# Patient Record
Sex: FEMALE | Race: WHITE | Employment: OTHER | URBAN - METROPOLITAN AREA
[De-identification: names, ages, dates, MRNs, and addresses within clinical notes are randomized per-mention and may not be internally consistent; named-entity substitution may affect disease eponyms.]

---

## 2017-04-21 ENCOUNTER — HOSPITAL ENCOUNTER (OUTPATIENT)
Dept: LAB | Age: 67
Discharge: HOME | End: 2017-04-21
Payer: MEDICARE

## 2017-04-21 PROCEDURE — 88305 TISSUE EXAM BY PATHOLOGIST: CPT | Performed by: PLASTIC SURGERY

## 2017-06-27 ENCOUNTER — HOSPITAL ENCOUNTER (OUTPATIENT)
Dept: LAB | Age: 67
Discharge: HOME | End: 2017-06-27
Payer: MEDICARE

## 2017-06-27 PROCEDURE — 88305 TISSUE EXAM BY PATHOLOGIST: CPT | Performed by: PLASTIC SURGERY

## 2017-06-27 PROCEDURE — 88331 PATH CONSLTJ SURG 1 BLK 1SPC: CPT | Performed by: PLASTIC SURGERY

## 2017-06-27 PROCEDURE — 88332 PATH CONSLTJ SURG EA ADD BLK: CPT | Performed by: PLASTIC SURGERY

## 2017-11-14 ENCOUNTER — HOSPITAL ENCOUNTER (OUTPATIENT)
Dept: LAB | Age: 67
Discharge: HOME | End: 2017-11-14
Payer: MEDICARE

## 2017-11-14 PROCEDURE — 88305 TISSUE EXAM BY PATHOLOGIST: CPT | Performed by: PLASTIC SURGERY

## 2017-12-08 ENCOUNTER — HOSPITAL ENCOUNTER (OUTPATIENT)
Dept: LAB | Age: 67
Discharge: HOME | End: 2017-12-08
Payer: MEDICARE

## 2017-12-08 PROCEDURE — 88305 TISSUE EXAM BY PATHOLOGIST: CPT | Performed by: PLASTIC SURGERY

## 2017-12-08 PROCEDURE — 88331 PATH CONSLTJ SURG 1 BLK 1SPC: CPT | Performed by: PLASTIC SURGERY

## 2017-12-08 PROCEDURE — 88332 PATH CONSLTJ SURG EA ADD BLK: CPT | Performed by: PLASTIC SURGERY

## 2018-06-21 ENCOUNTER — OFFICE VISIT (OUTPATIENT)
Dept: UROLOGY | Age: 68
End: 2018-06-21

## 2018-06-21 VITALS
HEIGHT: 61 IN | DIASTOLIC BLOOD PRESSURE: 88 MMHG | HEART RATE: 78 BPM | OXYGEN SATURATION: 95 % | WEIGHT: 162 LBS | SYSTOLIC BLOOD PRESSURE: 144 MMHG | BODY MASS INDEX: 30.58 KG/M2

## 2018-06-21 DIAGNOSIS — R39.15 URINARY URGENCY: ICD-10-CM

## 2018-06-21 DIAGNOSIS — N39.3 STRESS INCONTINENCE OF URINE: Primary | ICD-10-CM

## 2018-06-21 LAB
BILIRUB UR QL STRIP: NEGATIVE
GLUCOSE UR-MCNC: NEGATIVE MG/DL
KETONES P FAST UR STRIP-MCNC: NEGATIVE MG/DL
PH UR STRIP: 5.5 [PH] (ref 4.6–8)
PROT UR QL STRIP: NEGATIVE
SP GR UR STRIP: 1.01 (ref 1–1.03)
UA UROBILINOGEN AMB POC: NORMAL (ref 0.2–1)
URINALYSIS CLARITY POC: CLEAR
URINALYSIS COLOR POC: YELLOW
URINE BLOOD POC: NEGATIVE
URINE LEUKOCYTES POC: NORMAL
URINE NITRITES POC: NEGATIVE

## 2018-06-21 RX ORDER — HYDROCORTISONE ACETATE 1 %
200 CREAM (GRAM) TOPICAL
COMMUNITY

## 2018-06-21 RX ORDER — ASPIRIN 325 MG
300 TABLET, DELAYED RELEASE (ENTERIC COATED) ORAL
COMMUNITY

## 2018-06-21 RX ORDER — ASCORBIC ACID 250 MG
500 TABLET ORAL
COMMUNITY

## 2018-06-21 RX ORDER — GUAIFENESIN 100 MG/5ML
LIQUID (ML) ORAL
Status: ON HOLD | COMMUNITY
Start: 2014-04-23 | End: 2018-09-07

## 2018-06-21 RX ORDER — LOSARTAN POTASSIUM 100 MG/1
TABLET ORAL DAILY
COMMUNITY
Start: 2018-05-14

## 2018-06-21 RX ORDER — THERA TABS 400 MCG
TAB ORAL
COMMUNITY

## 2018-06-21 RX ORDER — SUCRALFATE 1 G/1
TABLET ORAL
Status: ON HOLD | COMMUNITY
Start: 2018-04-20 | End: 2018-09-07 | Stop reason: CLARIF

## 2018-06-21 RX ORDER — OMEPRAZOLE 20 MG/1
CAPSULE, DELAYED RELEASE ORAL
COMMUNITY
Start: 2018-04-30

## 2018-06-21 NOTE — MR AVS SNAPSHOT
[address] [facility] [place] [address] [address] 
[phone] Patient: Marcel Larry MRN: [id number] ZDR:[id number]/34/[id number] Visit Information Date & Time Provider Department Dept. Phone Encounter #  
 6/21/2018 10:00 AM Rainer Palacio, [id number] [id number] Your Appointments 7/17/2018 10:30 AM  
PROCEDURE with Rainer Palacio MD  
[facility]) Appt Note: cysto [address] [place] [address] [address]  
[phone] [address] [place] [address] Upcoming Health Maintenance Date Due Hepatitis C Screening 1950 DTaP/Tdap/Td series (1 - Tdap) 5/26/1971 FOBT Q 1 YEAR AGE 50-75 5/26/2000 ZOSTER VACCINE AGE 60> 3/26/2010 BREAST CANCER SCRN MAMMOGRAM 2/18/2015 GLAUCOMA SCREENING Q2Y 5/26/2015 Bone Densitometry (Dexa) Screening 5/26/2015 Pneumococcal 65+ Low/Medium Risk (1 of 2 - PCV13) 5/26/2015 MEDICARE YEARLY EXAM 6/21/2018 Influenza Age 5 to Adult 8/1/2018 Allergies as of 6/21/2018  Review Complete On: 6/21/2018 By: Gume Márquez LPN Severity Noted Reaction Type Reactions Nitrofurantoin Macrocrystalline Medium 10/09/2012    Rash Shellfish Containing Products Medium 11/05/2012    Hives, Rash Current Immunizations  Never Reviewed No immunizations on file. Not reviewed this visit You Were Diagnosed With   
  
 Codes Comments Urinary urgency    -  Primary ICD-10-CM: R39.15 ICD-9-CM: 469.03 Vitals BP Pulse Height(growth percentile) Weight(growth percentile) SpO2 BMI  
 144/88 (BP 1 Location: Right arm, BP Patient Position: Sitting) 78 5' 1\" (1.549 m) 162 lb (73.5 kg) 95% 30.61 kg/m2 Smoking Status Never Smoker BMI and BSA Data  Body Mass Index Body Surface Area  
 30.61 kg/m 2 1.78 m 2  
  
  
 Your Updated Medication List  
  
   
This list is accurate as of 6/21/18 10:34 AM.  Always use your most recent med list.  
  
  
  
  
 ascorbic acid (vitamin C) 250 mg tablet Commonly known as:  VITAMIN C  
500 mg.  
  
 aspirin 81 mg chewable tablet One every other day BIFIDOBACTERIUM INFANTIS PO Take  by Mouth Once a Day. FISH OIL 60- mg Cap Generic drug:  omega 3-dha-epa-fish oil 300 mg.  
  
 losartan 100 mg tablet Commonly known as:  COZAAR MILK THISTLE PO  
1,000 mg.  
  
 omeprazole 20 mg capsule Commonly known as:  PRILOSEC PSYLLIUM SEED (WITH DEXTROSE) PO Take  by Mouth Once a Day. Indications: Pt taking 2 capsule po every day  
  
 sucralfate 1 gram tablet Commonly known as:  CARAFATE  
  
 therapeutic multivitamin tablet Commonly known as:  [facility] Take 1 Tab by Mouth Once a Day. vitamin e 200 unit capsule Commonly known as:  AQUA GEMS  
200 Units. We Performed the Following AMB POC URINALYSIS DIP STICK AUTO W/O MICRO [68243 CPT(R)] Patient Instructions Urine Test: About This Test 
What is it? A urine test checks the color, clarity (clear or cloudy), odor, concentration, and acidity (pH) of your urine. It also checks your levels of protein, sugar, blood cells, or other substances in your urine. This test is sometimes called a urinalysis. Why is this test done? A urine test may be done: · To check for a disease or infection of the urinary tract. The urinary tract includes the kidneys, the tubes that carry urine from the kidneys to the bladder (ureters), and the bladder. It also includes the tube that carries urine from the bladder to outside the body (urethra). · To check the treatment of conditions such as diabetes, kidney stones, a urinary tract infection (UTI), high blood pressure, or some kidney or liver diseases.  
How can you prepare for the test? 
 · Before the test, don't eat foods that can change the color of your urine. Examples of these include blackberries, beets, and rhubarb. · Don't do heavy exercise before the test. 
· Tell your doctor if you are menstruating or close to starting your period. Your doctor may want to wait to do the test. 
· Tell your doctor about all the nonprescription and prescription medicines and herbs or other supplements you take. Some of these can affect the results of this test. 
What happens during the test? 
A urine test can be done in your doctor's office, clinic, or lab. Or you may be asked to collect a urine sample at home. Then you can take it to the office or lab for testing. Clean-catch midstream urine collection · Wash your hands before you start. · If the cup you are given has a lid, remove it carefully. Set it down with the inner surface up. Don't touch the inside of the cup with your fingers. · Clean the area around your genitals. ¨ For men: Pull back the foreskin, if present. Clean the head of your penis with medicated towelettes or swabs. ¨ For women: Spread open the genital folds of skin with one hand. Then use medicated towelettes or swabs in your other hand to clean the area where urine comes out (the urethra). Wipe the area from front to back. · Start urinating into the toilet or urinal. A woman should hold apart the genital folds of skin while she urinates. · After the urine has flowed for several seconds, place the cup into the urine stream. Collect about 2 ounces of urine without stopping your flow of urine. · Don't touch the rim of the cup to your genital area. Don't get toilet paper, pubic hair, stool (feces), menstrual blood, or anything else in the urine sample. · Finish urinating into the toilet or urinal. 
· Carefully replace and tighten the lid on the cup, and then return it to the lab. If you are collecting the urine at home and can't get it to the lab in an hour, refrigerate it. Double-voided urine sample collection This method collects the urine your body is making right now. · Urinate into the toilet or urinal. Don't collect any of this urine. · Drink a large glass of water, and wait about 30 to 40 minutes. · Then get a urine sample. Follow the instructions above for collecting a clean-catch urine sample. · Take the urine sample to the lab. If you are collecting the urine at home and can't get it to the lab in an hour, refrigerate it. Follow-up care is a key part of your treatment and safety. Be sure to make and go to all appointments, and call your doctor if you are having problems. It's also a good idea to keep a list of the medicines you take. Ask your doctor when you can expect to have your test results. Where can you learn more? Go to http://harjit-jennifer.info/. Enter R266 in the search box to learn more about \"Urine Test: About This Test.\" Current as of: October 14, 2016 Content Version: 11.4 © 6662-2667 [employer]. Care instructions adapted under license by [employer] (which disclaims liability or warranty for this information). If you have questions about a medical condition or this instruction, always ask your healthcare professional. [address] any warranty or liability for your use of this information. Introducing [facility]! Evelyn Khalil introduces Behalf patient portal. Now you can access parts of your medical record, email your doctor's office, and request medication refills online. 1. In your internet browser, go to [email or web address] 2. Click on the First Time User? Click Here link in the Sign In box. You will see the New Member Sign Up page. 3. Enter your Behalf Access Code exactly as it appears below. You will not need to use this code after youve completed the sign-up process.  If you do not sign up before the expiration date, you must request a new code. 
 
· PrePay Access Code: [email or web address] Expires: 9/19/2018 10:34 AM 
 
4. Enter the last four digits of your Social Security Number (xxxx) and Date of Birth (mm/dd/yyyy) as indicated and click Submit. You will be taken to the next sign-up page. 5. Create a PrePay ID. This will be your PrePay login ID and cannot be changed, so think of one that is secure and easy to remember. 6. Create a PrePay password. You can change your password at any time. 7. Enter your Password Reset Question and Answer. This can be used at a later time if you forget your password. 8. Enter your e-mail address. You will receive e-mail notification when new information is available in [address] 9. Click Sign Up. You can now view and download portions of your medical record. 10. Click the Download Summary menu link to download a portable copy of your medical information. If you have questions, please visit the Frequently Asked Questions section of the PrePay website. Remember, PrePay is NOT to be used for urgent needs. For medical emergencies, dial 911. Now available from your iPhone and Android! Please provide this summary of care documentation to your next provider. Your primary care clinician is listed as Gertrude Ruiz. If you have any questions after today's visit, please call [phone].

## 2018-06-21 NOTE — PATIENT INSTRUCTIONS
Urine Test: About This Test  What is it? A urine test checks the color, clarity (clear or cloudy), odor, concentration, and acidity (pH) of your urine. It also checks your levels of protein, sugar, blood cells, or other substances in your urine. This test is sometimes called a urinalysis. Why is this test done? A urine test may be done:  · To check for a disease or infection of the urinary tract. The urinary tract includes the kidneys, the tubes that carry urine from the kidneys to the bladder (ureters), and the bladder. It also includes the tube that carries urine from the bladder to outside the body (urethra). · To check the treatment of conditions such as diabetes, kidney stones, a urinary tract infection (UTI), high blood pressure, or some kidney or liver diseases. How can you prepare for the test?  · Before the test, don't eat foods that can change the color of your urine. Examples of these include blackberries, beets, and rhubarb. · Don't do heavy exercise before the test.  · Tell your doctor if you are menstruating or close to starting your period. Your doctor may want to wait to do the test.  · Tell your doctor about all the nonprescription and prescription medicines and herbs or other supplements you take. Some of these can affect the results of this test.  What happens during the test?  A urine test can be done in your doctor's office, clinic, or lab. Or you may be asked to collect a urine sample at home. Then you can take it to the office or lab for testing. Clean-catch midstream urine collection  · Wash your hands before you start. · If the cup you are given has a lid, remove it carefully. Set it down with the inner surface up. Don't touch the inside of the cup with your fingers. · Clean the area around your genitals. ¨ For men: Pull back the foreskin, if present. Clean the head of your penis with medicated towelettes or swabs.   ¨ For women: Spread open the genital folds of skin with one hand. Then use medicated towelettes or swabs in your other hand to clean the area where urine comes out (the urethra). Wipe the area from front to back. · Start urinating into the toilet or urinal. A woman should hold apart the genital folds of skin while she urinates. · After the urine has flowed for several seconds, place the cup into the urine stream. Collect about 2 ounces of urine without stopping your flow of urine. · Don't touch the rim of the cup to your genital area. Don't get toilet paper, pubic hair, stool (feces), menstrual blood, or anything else in the urine sample. · Finish urinating into the toilet or urinal.  · Carefully replace and tighten the lid on the cup, and then return it to the lab. If you are collecting the urine at home and can't get it to the lab in an hour, refrigerate it. Double-voided urine sample collection  This method collects the urine your body is making right now. · Urinate into the toilet or urinal. Don't collect any of this urine. · Drink a large glass of water, and wait about 30 to 40 minutes. · Then get a urine sample. Follow the instructions above for collecting a clean-catch urine sample. · Take the urine sample to the lab. If you are collecting the urine at home and can't get it to the lab in an hour, refrigerate it. Follow-up care is a key part of your treatment and safety. Be sure to make and go to all appointments, and call your doctor if you are having problems. It's also a good idea to keep a list of the medicines you take. Ask your doctor when you can expect to have your test results. Where can you learn more? Go to http://harjit-jennifer.info/. Enter R266 in the search box to learn more about \"Urine Test: About This Test.\"  Current as of: October 14, 2016  Content Version: 11.4  © 6304-6807 [employer], [employer].  Care instructions adapted under license by [employer] (which disclaims liability or warranty for this information). If you have questions about a medical condition or this instruction, always ask your healthcare professional. [place] [address] any warranty or liability for your use of this information.

## 2018-06-21 NOTE — PROGRESS NOTES
Ms. Isabel Harmon has a reminder for a \"due or due soon\" health maintenance. I have asked that she contact her primary care provider for follow-up on this health maintenance.

## 2018-06-22 NOTE — PROGRESS NOTES
Tamera Sapp 76 y.o. female     Ms. Marvin falls seen today for evaluation of urinary incontinence  Complains of involuntary loss of bladder control when performing Valsalva inducing physical exertions worsening slowly over the past 3-5 years-now using one pad per day    History of  tract disease, trauma, or surgery  No dysuria urgency or urgency incontinence  No neurologic symptoms    [facility] 1998  Anterior-posterior repair of pelvic laxity  Dr. Kwesi Alanis    Review of Systems:   CNS: No seizure syncope headaches dizziness or visual changes  Respiratory: No wheezing cough shortness of breath or chest pain  Cardiovascular: No angina no palpitations  Intestinal: No dyspepsia diarrhea or constipation  Urinary: No urgency frequency dysuria or hematuria  Skeletal: No bone or joint pain  Endocrine: No diabetes or thyroid disease  Other:                                                                                    3 para 2  ×2  (max birth weight 7.4)      Allergies: Allergies   Allergen Reactions    Nitrofurantoin Macrocrystalline Rash    Shellfish Containing Products Hives and Rash      Medications:    Current Outpatient Prescriptions   Medication Sig Dispense Refill    losartan (COZAAR) 100 mg tablet       omeprazole (PRILOSEC) 20 mg capsule       sucralfate (CARAFATE) 1 gram tablet       therapeutic multivitamin (THERAGRAN) tablet Take 1 Tab by Mouth Once a Day.  ascorbic acid, vitamin C, (VITAMIN C) 250 mg tablet 500 mg.      omega 3-dha-epa-fish oil (FISH OIL) 60- mg cap 300 mg.  aspirin 81 mg chewable tablet One every other day      vitamin e (AQUA GEMS) 200 unit capsule 200 Units.  BIFIDOBACTERIUM INFANTIS PO Take  by Mouth Once a Day.  PSYLLIUM SEED, WITH DEXTROSE, PO Take  by Mouth Once a Day. Indications: Pt taking 2 capsule po every day      MILK THISTLE PO 1,000 mg.          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     Unintentional weight change       Past Surgical History:   Procedure Laterality Date    HX BREAST REDUCTION      HX HYSTERECTOMY       Family History   Problem Relation Age of Onset    Cancer Mother     Colon Cancer Mother     Heart Disease Father     Hypertension Father     Cancer Maternal Aunt     Diabetes Paternal Grandmother         Physical Examination: Nourished mature female in no apparent distress    Abdomen is nontender  Back no percussion CVA tenderness  Lower extremity motor and sensory function are normal      Urinalysis: Dipstick/nitrite negative/heme-negative          Impression: Stress urinary incontinence with history of pelvic laxity        Plan: Cystoscopy with gross EMG and speculum exam    rtc 4 weeks        Jose Martin Tripathi MD  -electronically signed-    PLEASE NOTE:  This document has been produced using voice recognition software. Unrecognized errors in transcription may be present.

## 2018-07-13 ENCOUNTER — HOSPITAL ENCOUNTER (OUTPATIENT)
Dept: LAB | Age: 68
Discharge: HOME | End: 2018-07-13
Payer: MEDICARE

## 2018-07-13 PROCEDURE — 88305 TISSUE EXAM BY PATHOLOGIST: CPT | Performed by: PLASTIC SURGERY

## 2018-08-14 ENCOUNTER — OFFICE VISIT (OUTPATIENT)
Dept: UROLOGY | Age: 68
End: 2018-08-14

## 2018-08-14 VITALS
HEIGHT: 61 IN | DIASTOLIC BLOOD PRESSURE: 64 MMHG | WEIGHT: 163 LBS | SYSTOLIC BLOOD PRESSURE: 120 MMHG | BODY MASS INDEX: 30.78 KG/M2 | OXYGEN SATURATION: 94 % | HEART RATE: 78 BPM

## 2018-08-14 DIAGNOSIS — Z01.818 PRE-OP TESTING: ICD-10-CM

## 2018-08-14 DIAGNOSIS — N39.3 STRESS INCONTINENCE: ICD-10-CM

## 2018-08-14 RX ORDER — CIPROFLOXACIN 500 MG/1
500 TABLET ORAL 2 TIMES DAILY
Qty: 4 TAB | Refills: 0 | Status: SHIPPED | OUTPATIENT
Start: 2018-08-14 | End: 2018-08-16

## 2018-08-14 NOTE — MR AVS SNAPSHOT
[address] [facility] [address] [address] 
[phone] Patient: Caty Benjamin MRN: [id number] [facility]:3/59/9519 Visit Information Date & Time Provider Department Dept. Phone Encounter #  
 8/14/2018  2:00 PM Juan [facility]ak[facility] [place] [facility] [phone] [id number] Your Appointments 9/10/2018  1:45 PM  
POST OP with Da Boston MD  
[facility]) Appt Note: po sling with cath removal  
 [address] [place] [address] [address]  
[phone] [address] [place] [address] Upcoming Health Maintenance Date Due Hepatitis C Screening 1950 DTaP/Tdap/Td series (1 - Tdap) 5/26/1971 FOBT Q 1 YEAR AGE 50-75 5/26/2000 ZOSTER VACCINE AGE 60> 3/26/2010 BREAST CANCER SCRN MAMMOGRAM 2/18/2015 GLAUCOMA SCREENING Q2Y 5/26/2015 Bone Densitometry (Dexa) Screening 5/26/2015 Pneumococcal 65+ Low/Medium Risk (1 of 2 - PCV13) 5/26/2015 MEDICARE YEARLY EXAM 6/21/2018 Influenza Age 5 to Adult 8/1/2018 Allergies as of 8/14/2018  Review Complete On: 8/14/2018 By: Da Boston MD  
  
 Severity Noted Reaction Type Reactions Nitrofurantoin Macrocrystalline Medium 10/09/2012    Rash Shellfish Containing Products Medium 11/05/2012    Hives, Rash Current Immunizations  Never Reviewed No immunizations on file. Not reviewed this visit You Were Diagnosed With   
  
 Codes Comments Stress incontinence     ICD-10-CM: N39.3 ICD-9-CM: [id number] Pre-op testing     ICD-10-CM: M92.979 ICD-9-CM: V72.84 Vitals BP Pulse Height(growth percentile) Weight(growth percentile) SpO2 BMI  
 120/64 (BP 1 Location: Left arm, BP Patient Position: Sitting) 78 5' 1\" (1.549 m) 163 lb (73.9 kg) 94% 30.8 kg/m2 Smoking Status Never Smoker Vitals History BMI and BSA Data Body Mass Index Body Surface Area  
 30.8 kg/m 2 1.78 m 2 Preferred Pharmacy Pharmacy Name Phone [phone]  [phone] Your Updated Medication List  
  
   
This list is accurate as of 8/14/18  3:52 PM.  Always use your most recent med list.  
  
  
  
  
 ascorbic acid (vitamin C) 250 mg tablet Commonly known as:  VITAMIN C  
500 mg.  
  
 aspirin 81 mg chewable tablet One every other day BIFIDOBACTERIUM INFANTIS PO Take  by Mouth Once a Day. FISH OIL 60- mg Cap Generic drug:  omega 3-dha-epa-fish oil 300 mg.  
  
 losartan 100 mg tablet Commonly known as:  COZAAR MILK THISTLE PO  
1,000 mg.  
  
 omeprazole 20 mg capsule Commonly known as:  PRILOSEC PSYLLIUM SEED (WITH DEXTROSE) PO Take  by Mouth Once a Day. Indications: Pt taking 2 capsule po every day  
  
 sucralfate 1 gram tablet Commonly known as:  CARAFATE  
  
 therapeutic multivitamin tablet Commonly known as:  [facility] Take 1 Tab by Mouth Once a Day. vitamin e 200 unit capsule Commonly known as:  AQUA GEMS  
200 Units. We Performed the Following AMB POC URINALYSIS DIP STICK AUTO W/O MICRO [64895 CPT(R)] To-Do List   
 08/14/2018 Lab:  CBC W/O DIFF   
  
 08/14/2018 ECG:  EKG, 12 LEAD, INITIAL   
  
 08/14/2018 Lab:  METABOLIC PANEL, BASIC Patient Instructions Cystoscopy: Care Instructions Your Care Instructions Cystoscopy is a test. It uses a thin, lighted tube called a cystoscope to see the inside of the bladder and the urethra. The urethra is the tube that carries urine out of the body. This test is helpful because it lets your doctor see areas of your bladder and urethra that are hard to see on X-rays. It can help your doctor find bladder stones, tumors, bleeding, and infection.  During this test, your doctor also can take tissue and urine samples. And if your doctor finds small stones or growths, he or she can remove them. In most cases the scope is in the bladder for less than 10 minutes. But the entire test may take 45 minutes or longer. You will probably get local anesthesia. This numbs a small part of your body. Or you may get spinal anesthesia, which numbs more of your body. Once in a while, doctors use general anesthesia. It makes you sleep during surgery. If you get a local anesthetic, you may be able to get up right after the test. But if you had spinal or general anesthesia, you will stay in the recovery room until you are able to walk or you have feeling again in your lower body. This usually takes about an hour. Your doctor may be able to tell you some of the results right after the test. But the complete results may take several days. Follow-up care is a key part of your treatment and safety. Be sure to make and go to all appointments, and call your doctor if you are having problems. It's also a good idea to know your test results and keep a list of the medicines you take. How can you care for yourself at home? Before the test 
· If you are having a local anesthetic, you can eat and drink before the test. 
· If you are having a spinal or general anesthetic, do not eat or drink anything for at least 8 hours before the test. Tell your doctor what medicines you take. · If you are not staying overnight in the hospital, make sure you have someone who can drive you home after the test. 
After the test 
· If your doctor prescribed antibiotics, take them as directed. Do not stop taking them just because you feel better. You need to take the full course of antibiotics. · You may have some burning when you urinate for a day or two after the test. You may feel better if you drink more fluids. This may also help prevent an infection.  
· Your urine may have a pinkish color for a few days after the test. 
 When should you call for help? Call your doctor now or seek immediate medical care if: 
  · Your urine is still red or you see blood clots after you have urinated several times.  
  · You cannot pass urine 8 hours after the test.  
  · You get a fever or chills.  
  · You have pain in your belly or your back just below your rib cage. This is also called flank pain.  
 Watch closely for changes in your health, and be sure to contact your doctor if: 
  · You have pain or burning when you urinate. A burning sensation is normal for a day or two after the test. But call if it does not get better.  
  · You have a frequent urge to urinate but can pass only small amounts of urine.  
  · Your urine is pink, red, or cloudy or smells bad. It is normal for the urine to have a pinkish color for a few days after the test. But call if it does not get better.  
  · You do not get better as expected. Where can you learn more? Go to http://harjit-jennifer.info/. Enter B905 in the search box to learn more about \"Cystoscopy: Care Instructions. \" Current as of: May 12, 2017 Content Version: 11.7 © 7744-6982 [employer]. Care instructions adapted under license by [employer] (which disclaims liability or warranty for this information). If you have questions about a medical condition or this instruction, always ask your healthcare professional. [place] [address] any warranty or liability for your use of this information. Introducing [facility]! [facility] introduces [employer] patient portal. Now you can access parts of your medical record, email your doctor's office, and request medication refills online. 1. In your internet browser, go to [email or web address] 2. Click on the First Time User? Click Here link in the Sign In box. You will see the New Member Sign Up page. 3. Enter your BlockAvenue Access Code exactly as it appears below. You will not need to use this code after youve completed the sign-up process. If you do not sign up before the expiration date, you must request a new code. · BlockA[employer] Access Code: [email or web address] Expires: 9/19/2018 10:34 AM 
 
4. Enter the last four digits of your Social Security Number (xxxx) and Date of Birth (mm/dd/yyyy) as indicated and click Submit. You will be taken to the next sign-up page. 5. Create a BlockAvenue ID. This will be your BlockAvenue login ID and cannot be changed, so think of one that is secure and easy to remember. 6. Create a BlockAvenue password. You can change your password at any time. 7. Enter your Password Reset Question and Answer. This can be used at a later time if you forget your password. 8. Enter your e-mail address. You will receive e-mail notification when new information is available in [address]. 9. Click Sign Up. You can now view and download portions of your medical record. 10. Click the Download Summary menu link to download a portable copy of your medical information. If you have questions, please visit the Frequently Asked Questions section of the BlockAvenue website. Remember, [employer] is NOT to be used for urgent needs. For medical emergencies, dial 911. Now available from your iPhone and Android! Please provide this summary of care documentation to your next provider. Your primary care clinician is listed as Daniel Cope. If you have any questions after today's visit, please call [phone].

## 2018-08-14 NOTE — PROGRESS NOTES
Ms. Aisha Mccain has a reminder for a \"due or due soon\" health maintenance. I have asked that she contact her primary care provider for follow-up on this health maintenance.

## 2018-08-14 NOTE — PROGRESS NOTES
Ms. Adams Flores has a reminder for a \"due or due soon\" health maintenance. I have asked that she contact her primary care provider for follow-up on this health maintenance.

## 2018-08-14 NOTE — PATIENT INSTRUCTIONS
Cystoscopy: Care Instructions  Your Care Instructions  Cystoscopy is a test. It uses a thin, lighted tube called a cystoscope to see the inside of the bladder and the urethra. The urethra is the tube that carries urine out of the body. This test is helpful because it lets your doctor see areas of your bladder and urethra that are hard to see on X-rays. It can help your doctor find bladder stones, tumors, bleeding, and infection. During this test, your doctor also can take tissue and urine samples. And if your doctor finds small stones or growths, he or she can remove them. In most cases the scope is in the bladder for less than 10 minutes. But the entire test may take 45 minutes or longer. You will probably get local anesthesia. This numbs a small part of your body. Or you may get spinal anesthesia, which numbs more of your body. Once in a while, doctors use general anesthesia. It makes you sleep during surgery. If you get a local anesthetic, you may be able to get up right after the test. But if you had spinal or general anesthesia, you will stay in the recovery room until you are able to walk or you have feeling again in your lower body. This usually takes about an hour. Your doctor may be able to tell you some of the results right after the test. But the complete results may take several days. Follow-up care is a key part of your treatment and safety. Be sure to make and go to all appointments, and call your doctor if you are having problems. It's also a good idea to know your test results and keep a list of the medicines you take. How can you care for yourself at home? Before the test  · If you are having a local anesthetic, you can eat and drink before the test.  · If you are having a spinal or general anesthetic, do not eat or drink anything for at least 8 hours before the test. Tell your doctor what medicines you take.   · If you are not staying overnight in the hospital, make sure you have someone who can drive you home after the test.  After the test  · If your doctor prescribed antibiotics, take them as directed. Do not stop taking them just because you feel better. You need to take the full course of antibiotics. · You may have some burning when you urinate for a day or two after the test. You may feel better if you drink more fluids. This may also help prevent an infection. · Your urine may have a pinkish color for a few days after the test.  When should you call for help? Call your doctor now or seek immediate medical care if:    · Your urine is still red or you see blood clots after you have urinated several times.     · You cannot pass urine 8 hours after the test.     · You get a fever or chills.     · You have pain in your belly or your back just below your rib cage. This is also called flank pain.    Watch closely for changes in your health, and be sure to contact your doctor if:    · You have pain or burning when you urinate. A burning sensation is normal for a day or two after the test. But call if it does not get better.     · You have a frequent urge to urinate but can pass only small amounts of urine.     · Your urine is pink, red, or cloudy or smells bad. It is normal for the urine to have a pinkish color for a few days after the test. But call if it does not get better.     · You do not get better as expected. Where can you learn more? Go to http://harjit-jennifer.info/. Enter O906 in the search box to learn more about \"Cystoscopy: Care Instructions. \"  Current as of: May 12, 2017  Content Version: 11.7  © 9748-4519 [employer]. Care instructions adapted under license by [employer] (which disclaims liability or warranty for this information).  If you have questions about a medical condition or this instruction, always ask your healthcare professional. [address] any warranty or liability for your use of this information.

## 2018-08-14 NOTE — PROGRESS NOTES
[facility]  OFFICE PROCEDURE PROGRESS NOTE        Chart reviewed for the following:   Kayleigh Fajardo LPN, have reviewed the History, Physical and updated the Allergic reactions for [address] performed immediately prior to start of procedure:   Kayleigh Fajardo LPN, have performed the following reviews on Jewell Smith prior to the start of the procedure:            * Patient was identified by name and date of birth   * Agreement on procedure being performed was verified  * Risks and Benefits explained to the patient  * Procedure site verified and marked as necessary  * Patient was positioned for comfort  * Consent was signed and verified     Time: 3:22PM      Date of procedure: 8/14/2018    Procedure performed by:  Cecilia Lawrence MD    Provider assisted by: Rad Pack LPN    Patient assisted by: self    How tolerated by patient: tolerated the procedure well with no complications    Post Procedural Pain Scale: 0 - No Hurt    Comments: none    Patient verbalized understanding of procedure and post procedure instructions.

## 2018-08-15 NOTE — PROGRESS NOTES
Kusum Pardo 76 y.o. female     Ms. Danny Stewart seen today for Skiy assessing urinary incontinence  Complains of involuntary loss of bladder control when performing Valsalva inducing physical exertions worsening slowly over the past 3-5 years-now using one pad per day  Kegel exercise program has been ineffective in relieving HAYDEN     History of  tract disease, trauma, or surgery  No dysuria urgency or urgency incontinence  No neurologic symptoms     [facility]   Anterior-posterior repair of pelvic laxity  Dr. Jacklyn Cross     Review of Systems:   CNS: No seizure syncope headaches dizziness or visual changes  Respiratory: No wheezing cough shortness of breath or chest pain  Cardiovascular: No angina no palpitations  Intestinal: No dyspepsia diarrhea or constipation  Urinary: No urgency frequency dysuria or hematuria  Skeletal: No bone or joint pain  Endocrine: No diabetes or thyroid disease  Other:                                                                                    3 para 2  ×2  (max birth weight 7.4)          Allergies: Allergies   Allergen Reactions    Nitrofurantoin Macrocrystalline Rash    Shellfish Containing Products Hives and Rash      Medications:    Current Outpatient Prescriptions   Medication Sig Dispense Refill    ciprofloxacin HCl (CIPRO) 500 mg tablet Take 1 Tab by mouth two (2) times a day for 2 days. 4 Tab 0    losartan (COZAAR) 100 mg tablet       omeprazole (PRILOSEC) 20 mg capsule       sucralfate (CARAFATE) 1 gram tablet       therapeutic multivitamin (THERAGRAN) tablet Take 1 Tab by Mouth Once a Day.  ascorbic acid, vitamin C, (VITAMIN C) 250 mg tablet 500 mg.      omega 3-dha-epa-fish oil (FISH OIL) 60- mg cap 300 mg.  aspirin 81 mg chewable tablet One every other day      vitamin e (AQUA GEMS) 200 unit capsule 200 Units.  BIFIDOBACTERIUM INFANTIS PO Take  by Mouth Once a Day.       PSYLLIUM SEED, WITH DEXTROSE, PO Take  by Mouth Once a Day. Indications: Pt taking 2 capsule po every day      MILK THISTLE PO 1,000 mg. 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 Social History Main Topics    Smoking status: Never Smoker    Smokeless tobacco: Never Used    Alcohol use Yes    Drug use: No    Sexual activity: Yes     Other Topics Concern    Not on file     Social History Narrative      Family History   Problem Relation Age of Onset    Cancer Mother     Colon Cancer Mother     Heart Disease Father     Hypertension Father     Cancer Maternal Aunt     Diabetes Paternal Grandmother         Physical Examination: Well-nourished mature female in no apparent distress  Neck: No masses nodes or bruits  Lungs: No wheezes rales or rhonchi  Heart: Regular sinus rhythm no murmurs gallops or rubs  Abdomen: Nontender no palpable masses  Back: No percussion CVA tenderness  Skin: Warm and dry no rash no lesions  Neurologic: No motor sensory deficits in arms or legs      Urinalysis: Negative dipstick/nitrite negative/heme-negative    Cystoscopy Report: After prepping the introitus with Betadine solution and instilling 2% lidocaine jelly intraurethrally a flexible cystoscope was passed through the urethra into the bladder revealing normal urothelium throughout. There are no strictures, stones, tumors, or diverticuli-no trabeculation-no or abnormal vascularity-no glomerulations injection hemorrhages or friability evident.   Both ureteral orifices are slitlike in appearance with clear urine jets observed from both sides.-Procedure was uncomplicated and well-tolerated  Gross EMG: Bladder capacity 400 cc-no detrusor irritability-PVR less than 30 cc  Speculum examination-moderate hypermobility of anterior vaginal wall  Mary Daniel test positive for HAYDEN in supine position  Bimanual examination negative for pelvic and rectal masses        Impression: Stress urinary incontinence        Plan: Sling cystourethropexy Kegel exercise program has proven            Cipro 500 mg twice daily ×3 days    Counseling Note:    Indications for an technique of anterior rectus sling cystourethropexy have been described discussed-patient understands and accepts the risk of bleeding, infection, urinary retention, as well as persistence or recurrence of stress urinary incontinence despite initial improvement postoperatively      More than 1/2 of this 40 minute visit was spent in counselling and coordination of care, as described above. Laura Montano MD  -electronically signed-    PLEASE NOTE:  This document has been produced using voice recognition software. Unrecognized errors in transcription may be present.

## 2018-08-23 RX ORDER — SODIUM CHLORIDE 9 MG/ML
100 INJECTION, SOLUTION INTRAVENOUS CONTINUOUS
Status: CANCELLED | OUTPATIENT
Start: 2018-08-23

## 2018-08-29 ENCOUNTER — HOSPITAL ENCOUNTER (OUTPATIENT)
Dept: PREADMISSION TESTING | Age: 68
Discharge: HOME | End: 2018-08-29
Payer: MEDICARE

## 2018-08-29 DIAGNOSIS — N39.3 STRESS INCONTINENCE: ICD-10-CM

## 2018-08-29 DIAGNOSIS — Z01.818 PRE-OP TESTING: ICD-10-CM

## 2018-08-29 LAB
ANION GAP SERPL CALC-SCNC: 9 MMOL/L (ref 3–18)
BUN SERPL-MCNC: 10 MG/DL (ref 7–18)
BUN/CREAT SERPL: 19 (ref 12–20)
CALCIUM SERPL-MCNC: 9.5 MG/DL (ref 8.5–10.1)
CHLORIDE SERPL-SCNC: 103 MMOL/L (ref 100–108)
CO2 SERPL-SCNC: 26 MMOL/L (ref 21–32)
CREAT SERPL-MCNC: 0.54 MG/DL (ref 0.6–1.3)
ERYTHROCYTE [DISTWIDTH] IN BLOOD BY AUTOMATED COUNT: 12.1 % (ref 11.6–14.5)
GLUCOSE SERPL-MCNC: 102 MG/DL (ref 74–99)
HCT VFR BLD AUTO: 43.3 % (ref 35–45)
HGB BLD-MCNC: 15 G/DL (ref 12–16)
MCH RBC QN AUTO: 31.8 PG (ref 24–34)
MCHC RBC AUTO-ENTMCNC: 34.6 G/DL (ref 31–37)
MCV RBC AUTO: 91.9 FL (ref 74–97)
PLATELET # BLD AUTO: 264 K/UL (ref 135–420)
PMV BLD AUTO: 10.2 FL (ref 9.2–11.8)
POTASSIUM SERPL-SCNC: 4.3 MMOL/L (ref 3.5–5.5)
RBC # BLD AUTO: 4.71 M/UL (ref 4.2–5.3)
SODIUM SERPL-SCNC: 138 MMOL/L (ref 136–145)
WBC # BLD AUTO: 5.9 K/UL (ref 4.6–13.2)

## 2018-08-29 PROCEDURE — 80048 BASIC METABOLIC PNL TOTAL CA: CPT | Performed by: UROLOGY

## 2018-08-29 PROCEDURE — 93005 ELECTROCARDIOGRAM TRACING: CPT

## 2018-08-29 PROCEDURE — 85027 COMPLETE CBC AUTOMATED: CPT | Performed by: UROLOGY

## 2018-08-29 PROCEDURE — 36415 COLL VENOUS BLD VENIPUNCTURE: CPT | Performed by: UROLOGY

## 2018-08-30 LAB
ATRIAL RATE: 84 BPM
CALCULATED P AXIS, ECG09: 48 DEGREES
CALCULATED R AXIS, ECG10: 55 DEGREES
CALCULATED T AXIS, ECG11: 53 DEGREES
DIAGNOSIS, 93000: NORMAL
P-R INTERVAL, ECG05: 174 MS
Q-T INTERVAL, ECG07: 354 MS
QRS DURATION, ECG06: 88 MS
QTC CALCULATION (BEZET), ECG08: 418 MS
VENTRICULAR RATE, ECG03: 84 BPM

## 2018-08-30 RX ORDER — ACETAMINOPHEN, DIPHENHYDRAMINE HCL, PHENYLEPHRINE HCL 325; 25; 5 MG/1; MG/1; MG/1
TABLET ORAL
COMMUNITY

## 2018-09-06 ENCOUNTER — ANESTHESIA EVENT (OUTPATIENT)
Dept: SURGERY | Age: 68
End: 2018-09-06
Payer: MEDICARE

## 2018-09-07 ENCOUNTER — ANESTHESIA (OUTPATIENT)
Dept: SURGERY | Age: 68
End: 2018-09-07
Payer: MEDICARE

## 2018-09-07 ENCOUNTER — HOSPITAL ENCOUNTER (OUTPATIENT)
Age: 68
Setting detail: OUTPATIENT SURGERY
Discharge: HOME | End: 2018-09-07
Attending: UROLOGY | Admitting: UROLOGY
Payer: MEDICARE

## 2018-09-07 VITALS
TEMPERATURE: 98.1 F | HEART RATE: 82 BPM | DIASTOLIC BLOOD PRESSURE: 66 MMHG | HEIGHT: 62 IN | WEIGHT: 160 LBS | BODY MASS INDEX: 29.44 KG/M2 | OXYGEN SATURATION: 95 % | SYSTOLIC BLOOD PRESSURE: 104 MMHG | RESPIRATION RATE: 16 BRPM

## 2018-09-07 DIAGNOSIS — N39.3 SUI (STRESS URINARY INCONTINENCE, FEMALE): Primary | ICD-10-CM

## 2018-09-07 PROCEDURE — 77030020782 HC GWN BAIR PAWS FLX 3M -B: Performed by: UROLOGY

## 2018-09-07 PROCEDURE — 74011250636 HC RX REV CODE- 250/636

## 2018-09-07 PROCEDURE — 76210000021 HC REC RM PH II 0.5 TO 1 HR: Performed by: UROLOGY

## 2018-09-07 PROCEDURE — 77030008683 HC TU ET CUF COVD -A: Performed by: ANESTHESIOLOGY

## 2018-09-07 PROCEDURE — 76210000017 HC OR PH I REC 1.5 TO 2 HR: Performed by: UROLOGY

## 2018-09-07 PROCEDURE — 76060000035 HC ANESTHESIA 2 TO 2.5 HR: Performed by: UROLOGY

## 2018-09-07 PROCEDURE — 77030031139 HC SUT VCRL2 J&J -A: Performed by: UROLOGY

## 2018-09-07 PROCEDURE — 77030011265 HC ELECTRD BLD HEX COVD -A: Performed by: UROLOGY

## 2018-09-07 PROCEDURE — 77030038020 HC MANFLD NEPTUNE STRY -B: Performed by: UROLOGY

## 2018-09-07 PROCEDURE — 77030012961 HC IRR KT CYSTO/TUR ICUM -A: Performed by: UROLOGY

## 2018-09-07 PROCEDURE — 77030034850: Performed by: UROLOGY

## 2018-09-07 PROCEDURE — 74011000272 HC RX REV CODE- 272: Performed by: UROLOGY

## 2018-09-07 PROCEDURE — 77030018836 HC SOL IRR NACL ICUM -A: Performed by: UROLOGY

## 2018-09-07 PROCEDURE — 74011250637 HC RX REV CODE- 250/637: Performed by: NURSE ANESTHETIST, CERTIFIED REGISTERED

## 2018-09-07 PROCEDURE — 77030002888 HC SUT CHRMC J&J -A: Performed by: UROLOGY

## 2018-09-07 PROCEDURE — 74011000250 HC RX REV CODE- 250

## 2018-09-07 PROCEDURE — 77030032490 HC SLV COMPR SCD KNE COVD -B: Performed by: UROLOGY

## 2018-09-07 PROCEDURE — 76010000131 HC OR TIME 2 TO 2.5 HR: Performed by: UROLOGY

## 2018-09-07 PROCEDURE — 74011250636 HC RX REV CODE- 250/636: Performed by: NURSE ANESTHETIST, CERTIFIED REGISTERED

## 2018-09-07 PROCEDURE — 74011000250 HC RX REV CODE- 250: Performed by: UROLOGY

## 2018-09-07 RX ORDER — CEFAZOLIN SODIUM IN 0.9 % NACL 2 G/100 ML
PLASTIC BAG, INJECTION (ML) INTRAVENOUS AS NEEDED
Status: DISCONTINUED | OUTPATIENT
Start: 2018-09-07 | End: 2018-09-07 | Stop reason: HOSPADM

## 2018-09-07 RX ORDER — PROPOFOL 10 MG/ML
INJECTION, EMULSION INTRAVENOUS AS NEEDED
Status: DISCONTINUED | OUTPATIENT
Start: 2018-09-07 | End: 2018-09-07 | Stop reason: HOSPADM

## 2018-09-07 RX ORDER — SODIUM CHLORIDE, SODIUM LACTATE, POTASSIUM CHLORIDE, CALCIUM CHLORIDE 600; 310; 30; 20 MG/100ML; MG/100ML; MG/100ML; MG/100ML
75 INJECTION, SOLUTION INTRAVENOUS CONTINUOUS
Status: DISCONTINUED | OUTPATIENT
Start: 2018-09-07 | End: 2018-09-07 | Stop reason: HOSPADM

## 2018-09-07 RX ORDER — GLYCOPYRROLATE 0.2 MG/ML
INJECTION INTRAMUSCULAR; INTRAVENOUS AS NEEDED
Status: DISCONTINUED | OUTPATIENT
Start: 2018-09-07 | End: 2018-09-07 | Stop reason: HOSPADM

## 2018-09-07 RX ORDER — ONDANSETRON 2 MG/ML
INJECTION INTRAMUSCULAR; INTRAVENOUS AS NEEDED
Status: DISCONTINUED | OUTPATIENT
Start: 2018-09-07 | End: 2018-09-07 | Stop reason: HOSPADM

## 2018-09-07 RX ORDER — EPHEDRINE SULFATE/0.9% NACL/PF 25 MG/5 ML
SYRINGE (ML) INTRAVENOUS AS NEEDED
Status: DISCONTINUED | OUTPATIENT
Start: 2018-09-07 | End: 2018-09-07 | Stop reason: HOSPADM

## 2018-09-07 RX ORDER — NEOSTIGMINE METHYLSULFATE 5 MG/5 ML
SYRINGE (ML) INTRAVENOUS AS NEEDED
Status: DISCONTINUED | OUTPATIENT
Start: 2018-09-07 | End: 2018-09-07 | Stop reason: HOSPADM

## 2018-09-07 RX ORDER — SODIUM CHLORIDE 9 MG/ML
100 INJECTION, SOLUTION INTRAVENOUS CONTINUOUS
Status: DISCONTINUED | OUTPATIENT
Start: 2018-09-07 | End: 2018-09-07 | Stop reason: HOSPADM

## 2018-09-07 RX ORDER — SODIUM CHLORIDE, SODIUM LACTATE, POTASSIUM CHLORIDE, CALCIUM CHLORIDE 600; 310; 30; 20 MG/100ML; MG/100ML; MG/100ML; MG/100ML
INJECTION, SOLUTION INTRAVENOUS
Status: DISCONTINUED | OUTPATIENT
Start: 2018-09-07 | End: 2018-09-07 | Stop reason: HOSPADM

## 2018-09-07 RX ORDER — DIPHENHYDRAMINE HYDROCHLORIDE 50 MG/ML
12.5 INJECTION, SOLUTION INTRAMUSCULAR; INTRAVENOUS
Status: DISCONTINUED | OUTPATIENT
Start: 2018-09-07 | End: 2018-09-07 | Stop reason: HOSPADM

## 2018-09-07 RX ORDER — CIPROFLOXACIN 500 MG/1
500 TABLET ORAL 2 TIMES DAILY
Qty: 10 TAB | Refills: 0 | Status: SHIPPED | OUTPATIENT
Start: 2018-09-07 | End: 2018-09-12

## 2018-09-07 RX ORDER — FENTANYL CITRATE 50 UG/ML
50 INJECTION, SOLUTION INTRAMUSCULAR; INTRAVENOUS AS NEEDED
Status: DISCONTINUED | OUTPATIENT
Start: 2018-09-07 | End: 2018-09-07 | Stop reason: HOSPADM

## 2018-09-07 RX ORDER — FENTANYL CITRATE 50 UG/ML
INJECTION, SOLUTION INTRAMUSCULAR; INTRAVENOUS AS NEEDED
Status: DISCONTINUED | OUTPATIENT
Start: 2018-09-07 | End: 2018-09-07 | Stop reason: HOSPADM

## 2018-09-07 RX ORDER — DOCUSATE SODIUM 100 MG/1
100 CAPSULE, LIQUID FILLED ORAL 2 TIMES DAILY
Qty: 10 CAP | Refills: 2 | Status: SHIPPED | OUTPATIENT
Start: 2018-09-07 | End: 2018-09-12

## 2018-09-07 RX ORDER — SODIUM CHLORIDE 0.9 % (FLUSH) 0.9 %
5-10 SYRINGE (ML) INJECTION EVERY 8 HOURS
Status: DISCONTINUED | OUTPATIENT
Start: 2018-09-07 | End: 2018-09-07 | Stop reason: HOSPADM

## 2018-09-07 RX ORDER — FAMOTIDINE 20 MG/1
20 TABLET, FILM COATED ORAL ONCE
Status: COMPLETED | OUTPATIENT
Start: 2018-09-07 | End: 2018-09-07

## 2018-09-07 RX ORDER — HYDROMORPHONE HYDROCHLORIDE 2 MG/ML
0.5 INJECTION, SOLUTION INTRAMUSCULAR; INTRAVENOUS; SUBCUTANEOUS
Status: DISCONTINUED | OUTPATIENT
Start: 2018-09-07 | End: 2018-09-07 | Stop reason: HOSPADM

## 2018-09-07 RX ORDER — ONDANSETRON 2 MG/ML
4 INJECTION INTRAMUSCULAR; INTRAVENOUS ONCE
Status: DISCONTINUED | OUTPATIENT
Start: 2018-09-07 | End: 2018-09-07 | Stop reason: HOSPADM

## 2018-09-07 RX ORDER — HYDROMORPHONE HYDROCHLORIDE 2 MG/ML
INJECTION, SOLUTION INTRAMUSCULAR; INTRAVENOUS; SUBCUTANEOUS
Status: COMPLETED
Start: 2018-09-07 | End: 2018-09-07

## 2018-09-07 RX ORDER — DEXAMETHASONE SODIUM PHOSPHATE 4 MG/ML
INJECTION, SOLUTION INTRA-ARTICULAR; INTRALESIONAL; INTRAMUSCULAR; INTRAVENOUS; SOFT TISSUE AS NEEDED
Status: DISCONTINUED | OUTPATIENT
Start: 2018-09-07 | End: 2018-09-07 | Stop reason: HOSPADM

## 2018-09-07 RX ORDER — SODIUM CHLORIDE 0.9 % (FLUSH) 0.9 %
5-10 SYRINGE (ML) INJECTION AS NEEDED
Status: DISCONTINUED | OUTPATIENT
Start: 2018-09-07 | End: 2018-09-07 | Stop reason: HOSPADM

## 2018-09-07 RX ORDER — MIDAZOLAM HYDROCHLORIDE 1 MG/ML
INJECTION, SOLUTION INTRAMUSCULAR; INTRAVENOUS AS NEEDED
Status: DISCONTINUED | OUTPATIENT
Start: 2018-09-07 | End: 2018-09-07 | Stop reason: HOSPADM

## 2018-09-07 RX ORDER — OXYCODONE HYDROCHLORIDE 5 MG/1
5 TABLET ORAL
Qty: 12 TAB | Refills: 0 | Status: SHIPPED | OUTPATIENT
Start: 2018-09-07

## 2018-09-07 RX ORDER — SUCCINYLCHOLINE CHLORIDE 20 MG/ML
INJECTION INTRAMUSCULAR; INTRAVENOUS AS NEEDED
Status: DISCONTINUED | OUTPATIENT
Start: 2018-09-07 | End: 2018-09-07 | Stop reason: HOSPADM

## 2018-09-07 RX ORDER — ROCURONIUM BROMIDE 10 MG/ML
INJECTION, SOLUTION INTRAVENOUS AS NEEDED
Status: DISCONTINUED | OUTPATIENT
Start: 2018-09-07 | End: 2018-09-07 | Stop reason: HOSPADM

## 2018-09-07 RX ORDER — CEFAZOLIN SODIUM 2 G/50ML
2 SOLUTION INTRAVENOUS ONCE
Status: DISCONTINUED | OUTPATIENT
Start: 2018-09-07 | End: 2018-09-07 | Stop reason: HOSPADM

## 2018-09-07 RX ADMIN — Medication 10 MG: at 10:32

## 2018-09-07 RX ADMIN — Medication 2 MG: at 12:26

## 2018-09-07 RX ADMIN — Medication 2 G: at 10:35

## 2018-09-07 RX ADMIN — Medication 5 MG: at 10:56

## 2018-09-07 RX ADMIN — GLYCOPYRROLATE 0.2 MG: 0.2 INJECTION INTRAMUSCULAR; INTRAVENOUS at 12:26

## 2018-09-07 RX ADMIN — FENTANYL CITRATE 100 MCG: 50 INJECTION, SOLUTION INTRAMUSCULAR; INTRAVENOUS at 10:24

## 2018-09-07 RX ADMIN — MIDAZOLAM HYDROCHLORIDE 2 MG: 1 INJECTION, SOLUTION INTRAMUSCULAR; INTRAVENOUS at 10:17

## 2018-09-07 RX ADMIN — ONDANSETRON 4 MG: 2 INJECTION INTRAMUSCULAR; INTRAVENOUS at 12:13

## 2018-09-07 RX ADMIN — ROCURONIUM BROMIDE 10 MG: 10 INJECTION, SOLUTION INTRAVENOUS at 10:45

## 2018-09-07 RX ADMIN — FAMOTIDINE 20 MG: 20 TABLET ORAL at 07:54

## 2018-09-07 RX ADMIN — PROPOFOL 150 MG: 10 INJECTION, EMULSION INTRAVENOUS at 10:24

## 2018-09-07 RX ADMIN — HYDROMORPHONE HYDROCHLORIDE 0.5 MG: 2 INJECTION INTRAMUSCULAR; INTRAVENOUS; SUBCUTANEOUS at 13:00

## 2018-09-07 RX ADMIN — SODIUM CHLORIDE, SODIUM LACTATE, POTASSIUM CHLORIDE, AND CALCIUM CHLORIDE 75 ML/HR: 600; 310; 30; 20 INJECTION, SOLUTION INTRAVENOUS at 07:54

## 2018-09-07 RX ADMIN — FENTANYL CITRATE 50 MCG: 50 INJECTION, SOLUTION INTRAMUSCULAR; INTRAVENOUS at 10:44

## 2018-09-07 RX ADMIN — DEXAMETHASONE SODIUM PHOSPHATE 8 MG: 4 INJECTION, SOLUTION INTRA-ARTICULAR; INTRALESIONAL; INTRAMUSCULAR; INTRAVENOUS; SOFT TISSUE at 10:33

## 2018-09-07 RX ADMIN — SODIUM CHLORIDE, SODIUM LACTATE, POTASSIUM CHLORIDE, CALCIUM CHLORIDE: 600; 310; 30; 20 INJECTION, SOLUTION INTRAVENOUS at 10:17

## 2018-09-07 RX ADMIN — ROCURONIUM BROMIDE 5 MG: 10 INJECTION, SOLUTION INTRAVENOUS at 11:42

## 2018-09-07 RX ADMIN — PROPOFOL 40 MG: 10 INJECTION, EMULSION INTRAVENOUS at 10:44

## 2018-09-07 RX ADMIN — SUCCINYLCHOLINE CHLORIDE 140 MG: 20 INJECTION INTRAMUSCULAR; INTRAVENOUS at 10:24

## 2018-09-07 NOTE — BRIEF OP NOTE
BRIEF OPERATIVE NOTE Date of Procedure: 9/7/2018 Preoperative Diagnosis: Stress incontinence [N39.3] Postoperative Diagnosis: Stress incontinence [N39.3] Procedure(s): 
RECTUS SLING/CYSTOURETHROPEXY/HARVESTING TISSUE FOR SLING Surgeon(s) and Role: 
    Alvarez Tran MD - Primary Surgical Assistant: none Surgical Staff: 
Circ-1: Joycelyn Flores RN Scrub Tech-1:Halina Surg Asst-1: Marry Erickson Event Time In Incision Start [phone] Incision Close 1216 Anesthesia: General  
Estimated Blood Loss: 100 cc Specimens:none Findings: HAYDEN Complications:none Implants: none Rectus sling cystourethropexy Alvarez Tran MD

## 2018-09-07 NOTE — ANESTHESIA POSTPROCEDURE EVALUATION
Post-Anesthesia Evaluation and Assessment Patient: Zuleima Wise MRN: [id number]  SSN: [id number] YOB: 1950  Age: 76 y.o. Sex: female Cardiovascular Function/Vital Signs Visit Vitals  /63  Pulse 79  Temp 36.6 °C (97.8 °F)  Resp 15  Ht 5' 2\" (1.575 m)  Wt 72.6 kg (160 lb)  SpO2 97%  BMI 29.26 kg/m2 Patient is status post general anesthesia for Procedure(s): 
RECTUS SLING/CYSTOURETHROPEXY/HARVESTING TISSUE FOR SLING. Nausea/Vomiting: None Postoperative hydration reviewed and adequate. Pain: 
Pain Scale 1: Numeric (0 - 10) (09/07/18 0749) Pain Intensity 1: 0 (09/07/18 0749) Managed Neurological Status:  
Neuro (WDL): Within Defined Limits (09/07/18 0725) At baseline Mental Status and Level of Consciousness: Arousable Pulmonary Status:  
O2 Device: Oxygen mask (09/07/18 1230) Adequate oxygenation and airway patent Complications related to anesthesia: None Post-anesthesia assessment completed. No concerns Signed By: Magda Bruce CRNA September 7, 2018

## 2018-09-07 NOTE — BRIEF OP NOTE
BRIEF OPERATIVE NOTE Date of Procedure: 9/7/2018 Preoperative Diagnosis: Stress incontinence [N39.3] Postoperative Diagnosis: Stress incontinence [N39.3] Procedure(s): 
RECTUS SLING/CYSTOURETHROPEXY/HARVESTING TISSUE FOR SLING Surgeon(s) and Role: 
    Denis Malloy MD - Primary Surgical Assistant: none Surgical Staff: 
Circ-1: Anderson Bojorquez RN Scrub Tech-1: Wing Velázquez Surg Asst-1: Ines Sol Event Time In Incision Start [phone] Incision Close 1216 Anesthesia: General  
Estimated Blood Loss: 100 cc Specimens: none Findings: HAYDEN Complications: none Implants: none Rectus sling 16 Fr Chandler to gravity Denis Malloy MD

## 2018-09-07 NOTE — DISCHARGE INSTRUCTIONS
Light activity  Reg diet  Chandler to lgravity bag- d/c on Monday 9-10-18 at 0800  See in office at 1500 9-10-18  Rx Colace, Cipro, Oxycodone    rtc 3 days    Laura Montano MD          Laparoscopic Retropubic Suspension: What to Expect at [facility]  Retropubic suspension is surgery to treat stress urinary incontinence in women. The surgery lifts the sagging bladder and urethra and supports them in their normal positions. The urethra is the tube that carries urine from the bladder to outside the body. After surgery for urinary incontinence, you may feel weak and tired for several days. You will probably feel some pain or cramping in your lower belly and need pain medicine for a week or two. You may feel like you need to urinate more often, and your urine may be pink. This usually gets better 1 to 2 weeks after surgery. You will have a tube (catheter) in place to drain urine from your bladder. Your doctor will remove the catheter when it is no longer needed. You should have less or no urine leakage when you sneeze, cough, laugh, or exercise. In fact, at first you may find that it is harder than usual to empty your bladder. This usually gets better 1 to 2 weeks after the catheter is removed. You will probably be able to go back to work and most of your usual activities in 1 to 2 weeks. But you may need 4 to 6 weeks to fully recover. Try to avoid heavy lifting and strenuous activities that might put extra pressure on your bladder while you recover. This care sheet gives you a general idea about how long it will take for you to recover. But each person recovers at a different pace. Follow the steps below to get better as quickly as possible. How can you care for yourself at home? Activity    · Rest when you feel tired. Getting enough sleep will help you recover.     · Try to walk each day. Start by walking a little more than you did the day before. Bit by bit, increase the amount you walk.  Walking boosts blood flow and helps prevent pneumonia and constipation.     · Avoid strenuous activities, such as bicycle riding, jogging, weight lifting, or aerobic exercise, for 4 to 6 weeks or until your doctor says it is okay.     · For 4 to 6 weeks or until your doctor says it is okay, avoid lifting anything that would make you strain. This may include heavy grocery bags and milk containers, a heavy briefcase or backpack, cat litter or dog food bags, a child, or a vacuum .     · Ask your doctor when you can drive again.     · You will probably need to take 2 to 4 weeks off from work. It depends on the type of work you do and how you feel.     · You may shower as usual. Pat the cuts (incisions) dry. Do not take a bath for the first 2 weeks, or until your doctor tells you it is okay.     · Ask your doctor when it is okay for you to have sex. Diet    · You can eat your normal diet. If your stomach is upset, try bland, low-fat foods like plain rice, broiled chicken, toast, and yogurt.     · Drink plenty of fluids (unless your doctor tells you not to).     · You may notice that your bowel movements are not regular right after your surgery. This is common. Try to avoid constipation and straining with bowel movements. You may want to take a fiber supplement every day. If you have not had a bowel movement after a couple of days, ask your doctor about taking a mild laxative. Medicines    · Your doctor will tell you if and when you can restart your medicines. He or she will also give you instructions about taking any new medicines.     · If you take blood thinners, such as warfarin (Coumadin), clopidogrel (Plavix), or aspirin, be sure to talk to your doctor. He or she will tell you if and when to start taking those medicines again. Make sure that you understand exactly what your doctor wants you to do.     · Take pain medicines exactly as directed.   ¨ If the doctor gave you a prescription medicine for pain, take it as prescribed. ¨ If you are not taking a prescription pain medicine, ask your doctor if you can take an over-the-counter medicine.     · If you think your pain medicine is making you sick to your stomach:  ¨ Take your medicine after meals (unless your doctor has told you not to). ¨ Ask your doctor for a different pain medicine.     · If your doctor prescribed antibiotics, take them as directed. Do not stop taking them just because you feel better. You need to take the full course of antibiotics. Incision care    · If you have strips of tape on the incisions, leave the tape on for a week or until it falls off.     · Wash the area daily with warm, soapy water, and pat it dry. Don't use hydrogen peroxide or alcohol, which can slow healing. You may cover the area with a gauze bandage if it weeps or rubs against clothing. Change the bandage every day.     · Keep the area clean and dry. Exercise    · Ask your doctor when you can do pelvic floor (Kegel) exercises, which tighten and strengthen pelvic muscles. Your doctor may want you to wait several weeks after surgery before you do them.     · To do Kegel exercises:  ¨ Squeeze the same muscles you would use to stop your urine. Your belly and thighs should not move. ¨ Hold the squeeze for 3 seconds, and then relax for 3 seconds. ¨ Start with 3 seconds. Then add 1 second each week until you are able to squeeze for 10 seconds. ¨ Repeat the exercise 10 to 15 times for each session. Do three or more sessions each day. Follow-up care is a key part of your treatment and safety. Be sure to make and go to all appointments, and call your doctor if you are having problems. It's also a good idea to know your test results and keep a list of the medicines you take. When should you call for help? Call 911 anytime you think you may need emergency care.  For example, call if:    · You passed out (lost consciousness).     · You have severe trouble breathing.     · You have sudden chest pain and shortness of breath, or you cough up blood.     · You have severe pain in your belly.    Call your doctor now or seek immediate medical care if:    · You have bright red vaginal bleeding that soaks one or more pads in an hour, or you have large clots.     · You are sick to your stomach or cannot keep fluids down.     · You have pain that does not get better after you take pain medicine.     · You have loose stitches, or your incisions come open.     · Your incisions are bleeding.     · You have vaginal discharge that has increased in amount or smells bad.     · You have signs of infection, such as:  ¨ Increased pain, swelling, warmth, or redness. ¨ Red streaks leading from the incision. ¨ Pus draining from the incision. ¨ Swollen lymph nodes in your neck, armpits, or groin. ¨ A fever.     · You have clots of blood in your urine.     · You have trouble passing urine or stool, especially if you have pain or swelling in your lower belly.     · You have new or worse pain when you urinate.     · You have pain in your back just below your rib cage. This is called flank pain.    Watch closely for changes in your health, and be sure to contact your doctor if:    · You do not have a bowel movement after taking a laxative. Where can you learn more? Go to http://harjit-jennifer.info/. Enter F237 in the search box to learn more about \"Laparoscopic Retropubic Suspension: What to Expect at Home. \"  Current as of: May 12, 2017  Content Version: 11.7  © 5874-7907 [employer]. Care instructions adapted under license by [employer] (which disclaims liability or warranty for this information). If you have questions about a medical condition or this instruction, always ask your healthcare professional. [place] [address] any warranty or liability for your use of this information.     Constipation: Care Instructions  Your Care Instructions    Constipation means that you have a hard time passing stools (bowel movements). People pass stools from 3 times a day to once every 3 days. What is normal for you may be different. Constipation may occur with pain in the rectum and cramping. The pain may get worse when you try to pass stools. Sometimes there are small amounts of bright red blood on toilet paper or the surface of stools. This is because of enlarged veins near the rectum (hemorrhoids). A few changes in your diet and lifestyle may help you avoid ongoing constipation. Your doctor may also prescribe medicine to help loosen your stool. Some medicines can cause constipation. These include pain medicines and antidepressants. Tell your doctor about all the medicines you take. Your doctor may want to make a medicine change to ease your symptoms. Follow-up care is a key part of your treatment and safety. Be sure to make and go to all appointments, and call your doctor if you are having problems. It's also a good idea to know your test results and keep a list of the medicines you take. How can you care for yourself at home? · Drink plenty of fluids, enough so that your urine is light yellow or clear like water. If you have kidney, heart, or liver disease and have to limit fluids, talk with your doctor before you increase the amount of fluids you drink. · Include high-fiber foods in your diet each day. These include fruits, vegetables, beans, and whole grains. · Get at least 30 minutes of exercise on most days of the week. Walking is a good choice. You also may want to do other activities, such as running, swimming, cycling, or playing tennis or team sports. · Take a fiber supplement, such as Citrucel or Metamucil, every day. Read and follow all instructions on the label. · Schedule time each day for a bowel movement. A daily routine may help. Take your time having your bowel movement. · Support your feet with a small step stool when you sit on the toilet.  This helps flex your hips and places your pelvis in a squatting position. · Your doctor may recommend an over-the-counter laxative to relieve your constipation. Examples are Milk of Magnesia and MiraLax. Read and follow all instructions on the label. Do not use laxatives on a long-term basis. When should you call for help? Call your doctor now or seek immediate medical care if:    · You have new or worse belly pain.     · You have new or worse nausea or vomiting.     · You have blood in your stools.    Watch closely for changes in your health, and be sure to contact your doctor if:    · Your constipation is getting worse.     · You do not get better as expected. Where can you learn more? Go to http://harjit-jennifer.info/. Enter 21 [phone] in the search box to learn more about \"Constipation: Care Instructions. \"  Current as of: November 20, 2017  Content Version: 11.7  © 9409-5309 [employer]. Care instructions adapted under license by "[employer] (which disclaims liability or warranty for this information). If you have questions about a medical condition or this instruction, always ask your healthcare professional. [place] [address] any warranty or liability for your use of this information. Learning About Urinary Catheter Care to Prevent Infection  What is a urinary catheter? A urinary catheter is a flexible plastic tube used to drain urine from your bladder when you can't urinate on your own. The catheter allows urine to drain from the bladder into a bag. Two types of drainage bags may be used with a urinary catheter. · A bedside bag is a large bag that you can hang on the side of your bed or on a chair. You can use it overnight or anytime you will be sitting or lying down for a long time. · A leg bag is a small bag that you can use during the day. It is usually attached to your thigh or calf and hidden under your clothes.   Having a urinary catheter increases your risk of getting a urinary tract infection. Germs may get on the catheter and cause an infection in your bladder or kidneys. The longer you have a catheter, the more likely it is that you will get an infection. You can help prevent this problem with good hygiene and careful handling of your catheter and drainage bags. How can you help prevent infection? Take care to be clean  · Always wash your hands well before and after you handle your catheter. · Clean the skin around the catheter twice a day using soap and water. Dry with a clean towel afterward. You can shower with your catheter and drainage bag in place unless your doctor told you not to. · When you clean around the catheter, check the surrounding skin for signs of infection. Look for things like pus or irritated, swollen, red, or tender skin around the catheter. Be careful with your drainage bag  · Always keep the drainage bag below the level of your bladder. This will help keep urine from flowing back into your bladder. · Check often to see that urine is flowing through the catheter into the drainage bag. · Empty the drainage bag when it is half full. This will keep it from overflowing or backing up. · When you empty the drainage bag, do not let the tubing or drain spout touch anything. Be careful with your catheter  · Do not unhook the catheter from the drain tube. That could let germs get into the tube. · Make sure that the catheter tubing does not get twisted or kinked. · Do not tug or pull on the catheter. And make sure that the drainage bag does not drag or pull on the catheter. · Do not put powder or lotion on the skin around the catheter. · Talk with your doctor about your options for sexual intercourse while wearing a catheter. How do you empty a urine drainage bag? If your doctor has asked you to keep a record, write down the amount of urine in the bag before you empty it.   Wash your hands before and after you touch the bag.  1. Remove the drain spout from its sleeve at the bottom of the drainage bag.  2. Open the valve on the drain spout. Let the urine flow out into the toilet or a container. Be careful not to let the tubing or drain spout touch anything. 3. After you empty the bag, wipe off any liquid on the end of the drain spout. Close the valve. Then put the drain spout back into its sleeve at the bottom of the collection bag. How do you add a bedside bag to a leg bag? Wash your hands before and after you handle the bags. 1. Empty the leg bag attached to the catheter. 2. Put a clean towel under the leg bag.  3. Use an alcohol wipe to clean the tip of the bedside bag. Then connect the bedside bag to the leg bag. How can you clean a bedside drainage bag? Many people clean their bedside bag in the morning if they switch to a leg bag. To clean a bedside drainage ba. Remove the bedside bag from the leg bag.  2. Fill the bag with 2 parts vinegar and 3 parts water. Let it stand for 20 minutes. 3. Empty the bag, and let it air dry. When should you call for help? Call your doctor now or seek immediate medical care if:  · You have symptoms of a urinary infection. These may include:  ¨ Pain or burning when you urinate. ¨ A frequent need to urinate without being able to pass much urine. ¨ Pain in the flank, which is just below the rib cage and above the waist on either side of the back. ¨ Blood in your urine. ¨ A fever. · Your urine smells bad. · You see large blood clots in your urine. · No urine or very little urine is flowing into the bag for 4 or more hours. Watch closely for changes in your health, and be sure to contact your doctor if:  · The area around the catheter becomes irritated, swollen, red, or tender, or there is pus draining from it. · Urine is leaking from the place where the catheter enters your body. Follow-up care is a key part of your treatment and safety.  Be sure to make and go to all appointments, and call your doctor if you are having problems. It's also a good idea to know your test results and keep a list of the medicines you take. Where can you learn more? Go to http://harjit-jennifer.info/. Enter U010 in the search box to learn more about \"Learning About Urinary Catheter Care to Prevent Infection. \"  Current as of: May 12, 2017  Content Version: 11.7  © 0976-1085 [employer]. Care instructions adapted under license by [employer] (which disclaims liability or warranty for this information). If you have questions about a medical condition or this instruction, always ask your healthcare professional. [place] [address] any warranty or liability for your use of this information. Oxycodone, Rapid Release (ETH-Oxydose, Oxy IR, Roxicodone) - (By mouth)   Why this medicine is used:   Treats moderate to severe pain. This medicine is a narcotic pain reliever. Contact a nurse or doctor right away if you have:  · Fast or slow heart beat, shallow breathing, blue lips, skin or fingernails  · Anxiety, restlessness, fever, sweating, muscle spasms, twitching, seeing or hearing things that are not there  · Extreme weakness, shallow breathing, slow heartbeat  · Severe confusion, lightheadedness, dizziness, fainting  · Sweating or cold, clammy skin, seizures  · Severe constipation, stomach pain, nausea, vomiting     Common side effects:  · Mild constipation  · Sleepiness, tiredness  © 2017 [facility] Information is for End User's use only and may not be sold, redistributed or otherwise used for commercial purposes. Ciprofloxacin (Cipro) - (By mouth)   Why this medicine is used:   Treats infections.   Contact a nurse or doctor right away if you have:  · Blistering, peeling, or red skin rash  · Fast, slow, or uneven heartbeat; lightheadedness or fainting  · Severe or bloody diarrhea  · Pain, stiffness, swelling, or bruises around your ankle, leg, shoulder, or other joint     Common side effects:  · Nausea  · Headache  © 2017 [address]  Information is for End User's use only and may not be sold, redistributed or otherwise used for commercial purposes. Laxative, Stool Softeners (Doculax, Colace, Colace Clear, DSS) - (By mouth)   Why this medicine is used:   Treats constipation by helping you have a bowel movement. Contact a nurse or doctor right away if you have:  · Dark urine or pale stools  · Vomiting, loss of appetite, stomach pain  · Yellow skin or eyes     Common side effects:  · Nausea, diarrhea, stomach cramps, bitter taste in mouth  © 2017 [address] is for End User's use only and may not be sold, redistributed or otherwise used for commercial purposes. DISCHARGE SUMMARY from Nurse    PATIENT INSTRUCTIONS:    After general anesthesia or intravenous sedation, for 24 hours or while taking prescription Narcotics:  · Limit your activities  · Do not drive and operate hazardous machinery  · Do not make important personal or business decisions  · Do  not drink alcoholic beverages  · If you have not urinated within 8 hours after discharge, please contact your surgeon on call. Report the following to your surgeon:  · Excessive pain, swelling, redness or odor of or around the surgical area  · Temperature over 100.5  · Nausea and vomiting lasting longer than 4 hours or if unable to take medications  · Any signs of decreased circulation or nerve impairment to extremity: change in color, persistent  numbness, tingling, coldness or increase pain  · Any questions    *  Please give a list of your current medications to your Primary Care Provider. *  Please update this list whenever your medications are discontinued, doses are      changed, or new medications (including over-the-counter products) are added.     *  Please carry medication information at all times in case of emergency situations. These are general instructions for a healthy lifestyle:    No smoking/ No tobacco products/ Avoid exposure to second hand smoke  Surgeon General's Warning:  Quitting smoking now greatly reduces serious risk to your health. Obesity, smoking, and sedentary lifestyle greatly increases your risk for illness    A healthy diet, regular physical exercise & weight monitoring are important for maintaining a healthy lifestyle    You may be retaining fluid if you have a history of heart failure or if you experience any of the following symptoms:  Weight gain of 3 pounds or more overnight or 5 pounds in a week, increased swelling in our hands or feet or shortness of breath while lying flat in bed. Please call your doctor as soon as you notice any of these symptoms; do not wait until your next office visit. Recognize signs and symptoms of STROKE:    F-face looks uneven    A-arms unable to move or move unevenly    S-speech slurred or non-existent    T-time-call 911 as soon as signs and symptoms begin-DO NOT go       Back to bed or wait to see if you get better-TIME IS BRAIN. Warning Signs of HEART ATTACK     Call 911 if you have these symptoms:   Chest discomfort. Most heart attacks involve discomfort in the center of the chest that lasts more than a few minutes, or that goes away and comes back. It can feel like uncomfortable pressure, squeezing, fullness, or pain.  Discomfort in other areas of the upper body. Symptoms can include pain or discomfort in one or both arms, the back, neck, jaw, or stomach.  Shortness of breath with or without chest discomfort.  Other signs may include breaking out in a cold sweat, nausea, or lightheadedness. Don't wait more than five minutes to call 911 - MINUTES MATTER! Fast action can save your life. Calling 911 is almost always the fastest way to get lifesaving treatment.  Emergency Medical Services staff can begin treatment when they arrive -- up to an hour sooner than if someone gets to the hospital by car. The discharge information has been reviewed with the patient. The patient verbalized understanding. Discharge medications reviewed with the patient and appropriate educational materials and side effects teaching were provided.   ___________________________________________________________________________________________________________________________________

## 2018-09-07 NOTE — ANESTHESIA PREPROCEDURE EVALUATION
Anesthetic History No history of anesthetic complications Review of Systems / Medical History Patient summary reviewed and pertinent labs reviewed Pulmonary Sleep apnea: CPAP Neuro/Psych Within defined limits Cardiovascular Hypertension: well controlled Exercise tolerance: >4 METS 
  
GI/Hepatic/Renal 
Within defined limits Endo/Other Within defined limits Other Findings Comments: Documentation of current medication Current medications obtained, documented and obtained? YES Risk Factors for Postoperative nausea/vomiting: 
     History of postoperative nausea/vomiting? NO Female? YES Motion sickness? NO Intended opioid administration for postoperative analgesia? YES Smoking Abstinence: 
Current Smoker? NO Elective Surgery? YES Seen preoperatively by anesthesiologist or proxy prior to day of surgery? YES Pt abstained from smoking 24 hours prior to anesthesia? N/A Preventive care/screening for High Blood Pressure: 
Aged 18 years and older: YES Screened for high blood pressure: YES Patients with high blood pressure referred to primary care provider 
 for BP management: YES Physical Exam 
 
Airway Mallampati: II 
TM Distance: 4 - 6 cm Neck ROM: normal range of motion Mouth opening: Normal 
 
 Cardiovascular Regular rate and rhythm,  S1 and S2 normal,  no murmur, click, rub, or gallop Dental 
No notable dental hx Pulmonary Breath sounds clear to auscultation Abdominal 
GI exam deferred Other Findings Anesthetic Plan ASA: 2 Anesthesia type: general 
 
 
 
 
Induction: Intravenous Anesthetic plan and risks discussed with: Patient

## 2018-09-07 NOTE — H&P
Urology []Hide copied text Jadyn Kelley 76 y.o. female  
  
Ms. Matthew Ramey seen today for Skippy assessing urinary incontinence Complains of involuntary loss of bladder control when performing Valsalva inducing physical exertions worsening slowly over the past 3-5 years-now using one pad per day Kegel exercise program has been ineffective in relieving HAYDEN 
   
History of  tract disease, trauma, or surgery No dysuria urgency or urgency incontinence No neurologic symptoms 
   
[facility] 1998 Anterior-posterior repair of pelvic laxity  Dr. Qiana Alvarez 
   
Review of Systems:  
CNS: No seizure syncope headaches dizziness or visual changes Respiratory: No wheezing cough shortness of breath or chest pain 
Cardiovascular: No angina no palpitations Intestinal: No dyspepsia diarrhea or constipation Urinary: No urgency frequency dysuria or hematuria Skeletal: No bone or joint pain Endocrine: No diabetes or thyroid disease Other:                                                                                    3 para 2  ×2  (max birth weight 7.4) 
   
  
  
Allergies: Allergies Allergen Reactions  Nitrofurantoin Macrocrystalline Rash  Shellfish Containing Products Hives and Rash Medications:   
      
Current Outpatient Prescriptions Medication Sig Dispense Refill  ciprofloxacin HCl (CIPRO) 500 mg tablet Take 1 Tab by mouth two (2) times a day for 2 days. 4 Tab 0  
 losartan (COZAAR) 100 mg tablet        
 omeprazole (PRILOSEC) 20 mg capsule        
 sucralfate (CARAFATE) 1 gram tablet        
 therapeutic multivitamin (THERAGRAN) tablet Take 1 Tab by Mouth Once a Day.       
 ascorbic acid, vitamin C, (VITAMIN C) 250 mg tablet 500 mg.      
 omega 3-dha-epa-fish oil (FISH OIL) 60- mg cap 300 mg.      
 aspirin 81 mg chewable tablet One every other day      
 vitamin e (AQUA GEMS) 200 unit capsule 200 Units.      
  BIFIDOBACTERIUM INFANTIS PO Take  by Mouth Once a Day.      
 PSYLLIUM SEED, WITH DEXTROSE, PO Take  by Mouth Once a Day. Indications: Pt taking 2 capsule po every day      
 MILK THISTLE PO 1,000 mg.      
  
  
    
Past Medical History:  
Diagnosis Date  Back problem    
 Basal cell carcinoma    
 Heartburn    
 Hemorrhoid    
 History of bladder suspension procedure    
 Hypertension    
 Muscle ache    
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    
 Unintentional weight change    
  
     
Past Surgical History:  
Procedure Laterality Date  HX BREAST REDUCTION      
 HX HYSTERECTOMY      
  
Social History  
  
     
Social History  Marital status:   
    Spouse name: N/A  
 Number of children: N/A  
 Years of education: N/A  
  
   
Occupational History  Not on file.  
  
    
Social History Main Topics  Smoking status: Never Smoker  Smokeless tobacco: Never Used  Alcohol use Yes  Drug use: No  
 Sexual activity: Yes  
  
    
Other Topics Concern  Not on file  
  
Social History Narrative Family History Problem Relation Age of Onset  Cancer Mother    
 Colon Cancer Mother    
 Heart Disease Father    
 Hypertension Father    
 Cancer Maternal Aunt    
 Diabetes Paternal Grandmother    
  
  
Physical Examination: Well-nourished mature female in no apparent distress Neck: No masses nodes or bruits Lungs: No wheezes rales or rhonchi Heart: Regular sinus rhythm no murmurs gallops or rubs Abdomen: Nontender no palpable masses Back: No percussion CVA tenderness Skin: Warm and dry no rash no lesions Neurologic: No motor sensory deficits in arms or legs 
  
  
Urinalysis: Negative dipstick/nitrite negative/heme-negative 
  
Cystoscopy Report: 
  
After prepping the introitus with Betadine solution and instilling 2% lidocaine jelly intraurethrally a flexible cystoscope was passed through the urethra into the bladder revealing normal urothelium throughout. There are no strictures, stones, tumors, or diverticuli-no trabeculation-no or abnormal vascularity-no glomerulations injection hemorrhages or friability evident. Both ureteral orifices are slitlike in appearance with clear urine jets observed from both sides.-Procedure was uncomplicated and well-tolerated Gross EMG: Bladder capacity 400 cc-no detrusor irritability-PVR less than 30 cc Speculum examination-moderate hypermobility of anterior vaginal wall Cathern Moulder test positive for HAYDEN in supine position Bimanual examination negative for pelvic and rectal masses 
  
  
  
Impression: Stress urinary incontinence 
  
  
  
Plan: Sling cystourethropexy Kegel exercise program has proven           Cipro 500 mg twice daily ×3 days 
  
Counseling Note: 
  
Indications for an technique of anterior rectus sling cystourethropexy have been described discussed-patient understands and accepts the risk of bleeding, infection, urinary retention, as well as persistence or recurrence of stress urinary incontinence despite initial improvement postoperatively 
  
   
  
 
Flora Hennessy MD

## 2018-09-07 NOTE — IP AVS SNAPSHOT
Luis Eduardo Godinez 
 
 
 [address] [facility] [address] [facility] Patient: Bella Rebolledo MRN: [id number] About your hospitalization You were admitted on:  September 7, 2018 You last received care in the:  [facility] PHASE 2 RECOVERY You were discharged on:  September 7, 2018 Why you were hospitalized Your primary diagnosis was:  Not on File Follow-up Information Follow up With Details Comments Contact Info None   None ([phone]) Patient stated that they have no PCP Chidi Perez MD Go on 9/10/2018 3pm 4037 [address] [address] [address] 
[phone] Your Scheduled Appointments Monday September 10, 2018  1:45 PM EDT  
POST OP with Chidi Perez MD  
[facility] [address] [facility] [address] [address]  
[phone] Discharge Orders None A check taina indicates which time of day the medication should be taken. My Medications START taking these medications Instructions Each Dose to Equal  
 Morning Noon Evening Bedtime  
 ciprofloxacin HCl 500 mg tablet Commonly known as:  CIPRO Your last dose was: Your next dose is: Take 1 Tab by mouth two (2) times a day for 5 days. 500 mg  
    
   
   
   
  
 docusate sodium 100 mg capsule Commonly known as:  Dorthey Matsu Your last dose was: Your next dose is: Take 1 Cap by mouth two (2) times a day for 5 days. 100 mg  
    
   
   
   
  
 oxyCODONE IR 5 mg immediate release tablet Commonly known as:  [place] Your last dose was: Your next dose is: Take 1 Tab by mouth every six (6) hours as needed for Pain. Max Daily Amount: 20 mg.  
 5 mg CONTINUE taking these medications Instructions Each Dose to Equal  
 Morning Noon Evening Bedtime ascorbic acid (vitamin C) 250 mg tablet Commonly known as:  VITAMIN C Your last dose was: Your next dose is:    
   
   
 500 mg.  
 500 mg  
    
   
   
   
  
 BIFIDOBACTERIUM INFANTIS PO Your last dose was: Your next dose is: Take  by Mouth Once a Day. FISH OIL 60- mg Cap Generic drug:  omega 3-dha-epa-fish oil Your last dose was: Your next dose is:    
   
   
 300 mg.  
 300 mg  
    
   
   
   
  
 losartan 100 mg tablet Commonly known as:  COZAAR Your last dose was: Your next dose is:    
   
   
 daily. melatonin 10 mg Tab Your last dose was: Your next dose is: Take  by mouth nightly as needed. MILK THISTLE PO Your last dose was: Your next dose is:    
   
   
 1,000 mg.  
 1000 mg  
    
   
   
   
  
 omeprazole 20 mg capsule Commonly known as:  PRILOSEC Your last dose was: Your next dose is: PSYLLIUM SEED (WITH DEXTROSE) PO Your last dose was: Your next dose is: Take  by Mouth Once a Day. Indications: Pt taking 2 capsule po every day  
     
   
   
   
  
 therapeutic multivitamin tablet Commonly known as:  [facility] Your last dose was: Your next dose is: Take 1 Tab by Mouth Once a Day. vitamin e 200 unit capsule Commonly known as:  Avenzac Bro 83 Your last dose was: Your next dose is:    
   
   
 200 Units. 200 Units Where to Get Your Medications Information on where to get these meds will be given to you by the nurse or doctor. ! Ask your nurse or doctor about these medications  
  ciprofloxacin HCl 500 mg tablet  
 docusate sodium 100 mg capsule  
 oxyCODONE IR 5 mg immediate release tablet Opioid Education Prescription Opioids: What You Need to Know: 
 
 
rtc 3 days Flora Hennessy MD  
 
 
  
Laparoscopic Retropubic Suspension: What to Expect at [facility] Your Recovery Retropubic suspension is surgery to treat stress urinary incontinence in women. The surgery lifts the sagging bladder and urethra and supports them in their normal positions. The urethra is the tube that carries urine from the bladder to outside the body. After surgery for urinary incontinence, you may feel weak and tired for several days. You will probably feel some pain or cramping in your lower belly and need pain medicine for a week or two. You may feel like you need to urinate more often, and your urine may be pink. This usually gets better 1 to 2 weeks after surgery. You will have a tube (catheter) in place to drain urine from your bladder. Your doctor will remove the catheter when it is no longer needed. You should have less or no urine leakage when you sneeze, cough, laugh, or exercise. In fact, at first you may find that it is harder than usual to empty your bladder. This usually gets better 1 to 2 weeks after the catheter is removed. You will probably be able to go back to work and most of your usual activities in 1 to 2 weeks. But you may need 4 to 6 weeks to fully recover. Try to avoid heavy lifting and strenuous activities that might put extra pressure on your bladder while you recover. This care sheet gives you a general idea about how long it will take for you to recover. But each person recovers at a different pace. Follow the steps below to get better as quickly as possible. How can you care for yourself at home? Activity 
  · Rest when you feel tired. Getting enough sleep will help you recover.  
  · Try to walk each day. Start by walking a little more than you did the day before. Bit by bit, increase the amount you walk. Walking boosts blood flow and helps prevent pneumonia and constipation.  
  · Avoid strenuous activities, such as bicycle riding, jogging, weight lifting, or aerobic exercise, for 4 to 6 weeks or until your doctor says it is okay.  
  · For 4 to 6 weeks or until your doctor says it is okay, avoid lifting anything that would make you strain. This may include heavy grocery bags and milk containers, a heavy briefcase or backpack, cat litter or dog food bags, a child, or a vacuum .  
  · Ask your doctor when you can drive again.  
  · You will probably need to take 2 to 4 weeks off from work. It depends on the type of work you do and how you feel.  
  · You may shower as usual. Pat the cuts (incisions) dry. Do not take a bath for the first 2 weeks, or until your doctor tells you it is okay.  
  · Ask your doctor when it is okay for you to have sex. Diet 
  · You can eat your normal diet. If your stomach is upset, try bland, low-fat foods like plain rice, broiled chicken, toast, and yogurt.  
  · Drink plenty of fluids (unless your doctor tells you not to).  
  · You may notice that your bowel movements are not regular right after your surgery. This is common. Try to avoid constipation and straining with bowel movements. You may want to take a fiber supplement every day. If you have not had a bowel movement after a couple of days, ask your doctor about taking a mild laxative. Medicines 
  · Your doctor will tell you if and when you can restart your medicines.  He or she will also give you instructions about taking any new medicines.  
  · If you take blood thinners, such as warfarin (Coumadin), clopidogrel (Plavix), or aspirin, be sure to talk to your doctor. He or she will tell you if and when to start taking those medicines again. Make sure that you understand exactly what your doctor wants you to do.  
  · Take pain medicines exactly as directed. ¨ If the doctor gave you a prescription medicine for pain, take it as prescribed. ¨ If you are not taking a prescription pain medicine, ask your doctor if you can take an over-the-counter medicine.  
  · If you think your pain medicine is making you sick to your stomach: 
¨ Take your medicine after meals (unless your doctor has told you not to). ¨ Ask your doctor for a different pain medicine.  
  · If your doctor prescribed antibiotics, take them as directed. Do not stop taking them just because you feel better. You need to take the full course of antibiotics. Incision care 
  · If you have strips of tape on the incisions, leave the tape on for a week or until it falls off.  
  · Wash the area daily with warm, soapy water, and pat it dry. Don't use hydrogen peroxide or alcohol, which can slow healing. You may cover the area with a gauze bandage if it weeps or rubs against clothing. Change the bandage every day.  
  · Keep the area clean and dry. Exercise 
  · Ask your doctor when you can do pelvic floor (Kegel) exercises, which tighten and strengthen pelvic muscles. Your doctor may want you to wait several weeks after surgery before you do them.  
  · To do Kegel exercises: 
¨ Squeeze the same muscles you would use to stop your urine. Your belly and thighs should not move. ¨ Hold the squeeze for 3 seconds, and then relax for 3 seconds. ¨ Start with 3 seconds. Then add 1 second each week until you are able to squeeze for 10 seconds. ¨ Repeat the exercise 10 to 15 times for each session. Do three or more sessions each day. Follow-up care is a key part of your treatment and safety.  Be sure to make and go to all appointments, and call your doctor if you are having problems. It's also a good idea to know your test results and keep a list of the medicines you take. When should you call for help? Call 911 anytime you think you may need emergency care. For example, call if: 
  · You passed out (lost consciousness).  
  · You have severe trouble breathing.  
  · You have sudden chest pain and shortness of breath, or you cough up blood.  
  · You have severe pain in your belly.  
 Call your doctor now or seek immediate medical care if: 
  · You have bright red vaginal bleeding that soaks one or more pads in an hour, or you have large clots.  
  · You are sick to your stomach or cannot keep fluids down.  
  · You have pain that does not get better after you take pain medicine.  
  · You have loose stitches, or your incisions come open.  
  · Your incisions are bleeding.  
  · You have vaginal discharge that has increased in amount or smells bad.  
  · You have signs of infection, such as: 
¨ Increased pain, swelling, warmth, or redness. ¨ Red streaks leading from the incision. ¨ Pus draining from the incision. ¨ Swollen lymph nodes in your neck, armpits, or groin. ¨ A fever.  
  · You have clots of blood in your urine.  
  · You have trouble passing urine or stool, especially if you have pain or swelling in your lower belly.  
  · You have new or worse pain when you urinate.  
  · You have pain in your back just below your rib cage. This is called flank pain.  
 Watch closely for changes in your health, and be sure to contact your doctor if: 
  · You do not have a bowel movement after taking a laxative. Where can you learn more? Go to http://harjit-jennifer.info/. Enter F237 in the search box to learn more about \"Laparoscopic Retropubic Suspension: What to Expect at Home. \" Current as of: May 12, 2017 Content Version: 11.7 © 9817-2886 [employer].  Care instructions adapted under license by [address] (which disclaims liability or warranty for this information). If you have questions about a medical condition or this instruction, always ask your healthcare professional. [address] any warranty or liability for your use of this information. Constipation: Care Instructions Your Care Instructions Constipation means that you have a hard time passing stools (bowel movements). People pass stools from 3 times a day to once every 3 days. What is normal for you may be different. Constipation may occur with pain in the rectum and cramping. The pain may get worse when you try to pass stools. Sometimes there are small amounts of bright red blood on toilet paper or the surface of stools. This is because of enlarged veins near the rectum (hemorrhoids). A few changes in your diet and lifestyle may help you avoid ongoing constipation. Your doctor may also prescribe medicine to help loosen your stool. Some medicines can cause constipation. These include pain medicines and antidepressants. Tell your doctor about all the medicines you take. Your doctor may want to make a medicine change to ease your symptoms. Follow-up care is a key part of your treatment and safety. Be sure to make and go to all appointments, and call your doctor if you are having problems. It's also a good idea to know your test results and keep a list of the medicines you take. How can you care for yourself at home? · Drink plenty of fluids, enough so that your urine is light yellow or clear like water. If you have kidney, heart, or liver disease and have to limit fluids, talk with your doctor before you increase the amount of fluids you drink. · Include high-fiber foods in your diet each day. These include fruits, vegetables, beans, and whole grains. · Get at least 30 minutes of exercise on most days of the week.  Walking is a good choice. You also may want to do other activities, such as running, swimming, cycling, or playing tennis or team sports. · Take a fiber supplement, such as Citrucel or Metamucil, every day. Read and follow all instructions on the label. · Schedule time each day for a bowel movement. A daily routine may help. Take your time having your bowel movement. · Support your feet with a small step stool when you sit on the toilet. This helps flex your hips and places your pelvis in a squatting position. · Your doctor may recommend an over-the-counter laxative to relieve your constipation. Examples are Milk of Magnesia and MiraLax. Read and follow all instructions on the label. Do not use laxatives on a long-term basis. When should you call for help? Call your doctor now or seek immediate medical care if: 
  · You have new or worse belly pain.  
  · You have new or worse nausea or vomiting.  
  · You have blood in your stools.  
 Watch closely for changes in your health, and be sure to contact your doctor if: 
  · Your constipation is getting worse.  
  · You do not get better as expected. Where can you learn more? Go to http://harjit-jennifer.info/. Enter 21  in the search box to learn more about \"Constipation: Care Instructions. \" Current as of: November 20, 2017 Content Version: 11.7 © 9122-2490 [employer]. Care instructions adapted under license by [employer] (which disclaims liability or warranty for this information). If you have questions about a medical condition or this instruction, always ask your healthcare professional. [place] [address] any warranty or liability for your use of this information. Learning About Urinary Catheter Care to Prevent Infection What is a urinary catheter? A urinary catheter is a flexible plastic tube used to drain urine from your bladder when you can't urinate on your own.  The catheter allows urine to drain from the bladder into a bag. Two types of drainage bags may be used with a urinary catheter. · A bedside bag is a large bag that you can hang on the side of your bed or on a chair. You can use it overnight or anytime you will be sitting or lying down for a long time. · A leg bag is a small bag that you can use during the day. It is usually attached to your thigh or calf and hidden under your clothes. Having a urinary catheter increases your risk of getting a urinary tract infection. Germs may get on the catheter and cause an infection in your bladder or kidneys. The longer you have a catheter, the more likely it is that you will get an infection. You can help prevent this problem with good hygiene and careful handling of your catheter and drainage bags. How can you help prevent infection? Take care to be clean · Always wash your hands well before and after you handle your catheter. · Clean the skin around the catheter twice a day using soap and water. Dry with a clean towel afterward. You can shower with your catheter and drainage bag in place unless your doctor told you not to. · When you clean around the catheter, check the surrounding skin for signs of infection. Look for things like pus or irritated, swollen, red, or tender skin around the catheter. Be careful with your drainage bag · Always keep the drainage bag below the level of your bladder. This will help keep urine from flowing back into your bladder. · Check often to see that urine is flowing through the catheter into the drainage bag. · Empty the drainage bag when it is half full. This will keep it from overflowing or backing up. · When you empty the drainage bag, do not let the tubing or drain spout touch anything. Be careful with your catheter · Do not unhook the catheter from the drain tube. That could let germs get into the tube. · Make sure that the catheter tubing does not get twisted or kinked. · Do not tug or pull on the catheter. And make sure that the drainage bag does not drag or pull on the catheter. · Do not put powder or lotion on the skin around the catheter. · Talk with your doctor about your options for sexual intercourse while wearing a catheter. How do you empty a urine drainage bag? If your doctor has asked you to keep a record, write down the amount of urine in the bag before you empty it. Wash your hands before and after you touch the bag. 1. Remove the drain spout from its sleeve at the bottom of the drainage bag. 
2. Open the valve on the drain spout. Let the urine flow out into the toilet or a container. Be careful not to let the tubing or drain spout touch anything. 3. After you empty the bag, wipe off any liquid on the end of the drain spout. Close the valve. Then put the drain spout back into its sleeve at the bottom of the collection bag. How do you add a bedside bag to a leg bag? Wash your hands before and after you handle the bags. 1. Empty the leg bag attached to the catheter. 2. Put a clean towel under the leg bag. 
3. Use an alcohol wipe to clean the tip of the bedside bag. Then connect the bedside bag to the leg bag. How can you clean a bedside drainage bag? Many people clean their bedside bag in the morning if they switch to a leg bag. To clean a bedside drainage ba. Remove the bedside bag from the leg bag. 
2. Fill the bag with 2 parts vinegar and 3 parts water. Let it stand for 20 minutes. 3. Empty the bag, and let it air dry. When should you call for help? Call your doctor now or seek immediate medical care if: 
· You have symptoms of a urinary infection. These may include: 
¨ Pain or burning when you urinate. ¨ A frequent need to urinate without being able to pass much urine. ¨ Pain in the flank, which is just below the rib cage and above the waist on either side of the back. ¨ Blood in your urine. ¨ A fever. · Your urine smells bad. · You see large blood clots in your urine. · No urine or very little urine is flowing into the bag for 4 or more hours. Watch closely for changes in your health, and be sure to contact your doctor if: · The area around the catheter becomes irritated, swollen, red, or tender, or there is pus draining from it. · Urine is leaking from the place where the catheter enters your body. Follow-up care is a key part of your treatment and safety. Be sure to make and go to all appointments, and call your doctor if you are having problems. It's also a good idea to know your test results and keep a list of the medicines you take. Where can you learn more? Go to http://harjit-jennifer.info/. Enter U010 in the search box to learn more about \"Learning About Urinary Catheter Care to Prevent Infection. \" Current as of: May 12, 2017 Content Version: 11.7 © 1544-4817 [employer]. Care instructions adapted under license by [employer] (which disclaims liability or warranty for this information). If you have questions about a medical condition or this instruction, always ask your healthcare professional. [place] [address] any warranty or liability for your use of this information. Oxycodone, Rapid Release (ETH-Oxydose, Oxy IR, Roxicodone) - (By mouth) Why this medicine is used:  
Treats moderate to severe pain. This medicine is a narcotic pain reliever. Contact a nurse or doctor right away if you have: 
· Fast or slow heart beat, shallow breathing, blue lips, skin or fingernails · Anxiety, restlessness, fever, sweating, muscle spasms, twitching, seeing or hearing things that are not there · Extreme weakness, shallow breathing, slow heartbeat · Severe confusion, lightheadedness, dizziness, fainting · Sweating or cold, clammy skin, seizures · Severe constipation, stomach pain, nausea, vomiting Common side effects: · Mild constipation · Sleepiness, tiredness © 2017 [facility] Information is for End User's use only and may not be sold, redistributed or otherwise used for commercial purposes. Ciprofloxacin (Cipro) - (By mouth) Why this medicine is used:  
Treats infections. Contact a nurse or doctor right away if you have: · Blistering, peeling, or red skin rash · Fast, slow, or uneven heartbeat; lightheadedness or fainting · Severe or bloody diarrhea · Pain, stiffness, swelling, or bruises around your ankle, leg, shoulder, or other joint Common side effects: 
· Nausea · Headache © 2017 [facility] Information is for End User's use only and may not be sold, redistributed or otherwise used for commercial purposes. Laxative, Stool Softeners (Doculax, Colace, Colace Clear, DSS) - (By mouth) Why this medicine is used:  
Treats constipation by helping you have a bowel movement. Contact a nurse or doctor right away if you have: · Dark urine or pale stools · Vomiting, loss of appetite, stomach pain · Yellow skin or eyes Common side effects: 
· Nausea, diarrhea, stomach cramps, bitter taste in mouth © 2017 [facility] Information is for End User's use only and may not be sold, redistributed or otherwise used for commercial purposes. DISCHARGE SUMMARY from Nurse PATIENT INSTRUCTIONS: 
 
 
F-face looks uneven A-arms unable to move or move unevenly S-speech slurred or non-existent T-time-call 911 as soon as signs and symptoms begin-DO NOT go Back to bed or wait to see if you get better-TIME IS BRAIN. Warning Signs of HEART ATTACK Call 911 if you have these symptoms: 
? Chest discomfort. Most heart attacks involve discomfort in the center of the chest that lasts more than a few minutes, or that goes away and comes back. It can feel like uncomfortable pressure, squeezing, fullness, or pain. ? Discomfort in other areas of the upper body. Symptoms can include pain or discomfort in one or both arms, the back, neck, jaw, or stomach. ? Shortness of breath with or without chest discomfort. ? Other signs may include breaking out in a cold sweat, nausea, or lightheadedness. Don't wait more than five minutes to call [address]! Fast action can save your life. Calling 911 is almost always the fastest way to get lifesaving treatment. Emergency Medical Services staff can begin treatment when they arrive  up to an hour sooner than if someone gets to the hospital by car. The discharge information has been reviewed with the patient. The patient verbalized understanding. Discharge medications reviewed with the patient and appropriate educational materials and side effects teaching were provided. ___________________________________________________________________________________________________________________________________ [employer] Announcement We are excited to announce that we are making your provider's discharge notes available to you in Audicus. You will see these notes when they are completed and signed by the physician that discharged you from your recent hospital stay. If you have any questions or concerns about any information you see in The [employer], please call the Health Information Department where you were seen or reach out to your Primary Care Provider for more information about your plan of care. Introducing [facility]! [employer] introduces Audicus patient portal. Now you can access parts of your medical record, email your doctor's office, and request medication refills online. 1. In your internet browser, go to [email or web address] 2. Click on the First Time User? Click Here link in the Sign In box. You will see the New Member Sign Up page. 3. Enter your Audicus Access Code exactly as it appears below.  You will not need to use this code after youve completed the sign-up process. If you do not sign up before the expiration date, you must request a new code. · [employer] Access Code: [email or web address] Expires: 9/19/2018 10:34 AM 
 
4. Enter the last four digits of your Social Security Number (xxxx) and Date of Birth (mm/dd/yyyy) as indicated and click Submit. You will be taken to the next sign-up page. 5. Create a [employer] ID. This will be your [employer] login ID and cannot be changed, so think of one that is secure and easy to remember. 6. Create a [employer] password. You can change your password at any time. 7. Enter your Password Reset Question and Answer. This can be used at a later time if you forget your password. 8. Enter your e-mail address. You will receive e-mail notification when new information is available in [address] 9. Click Sign Up. You can now view and download portions of your medical record. 10. Click the Download Summary menu link to download a portable copy of your medical information. If you have questions, please visit the Frequently Asked Questions section of the [employer] website. Remember, [employer] is NOT to be used for urgent needs. For medical emergencies, dial 911. Now available from your iPhone and Android! Introducing Danny Akins As a [facility] patient, I wanted to make you aware of our electronic visit tool called Danny Akins. [facility] 24/7 allows you to connect within minutes with a medical provider 24 hours a day, seven days a week via a mobile device or tablet or logging into a secure website from your computer. You can access Danny Moisesperlafin from anywhere in the [place].  
 
A virtual visit might be right for you when you have a simple condition and feel like you just dont want to get out of bed, or cant get away from work for an appointment, when your regular [facility] provider is not available (evenings, weekends or holidays), or when youre out of town and need minor care. Electronic visits cost only $49 and if the [facility] [employer] provider determines a prescription is needed to treat your condition, one can be electronically transmitted to a nearby pharmacy*. Please take a moment to enroll today if you have not already done so. The enrollment process is free and takes just a few minutes. To enroll, please download the Ruth Kunstadter â€“ The [employer] austyn to your tablet or phone, or visit [email or web address] to enroll on your computer. And, as an [address] [place] [address] patient with a [employer] account, the results of your visits will be scanned into your electronic medical record and your primary care provider will be able to view the scanned results. We urge you to continue to see your regular [facility] provider for your ongoing medical care. And while your primary care provider may not be the one available when you seek a [employer] virtual visit, the peace of mind you get from getting a real diagnosis real time can be priceless. For more information on [employer], view our Frequently Asked Questions (FAQs) at [email or web address]. Sincerely, 
 
Aida Yoon MD 
Chief Medical Officer [address] [place] [address] *:  certain medications cannot be prescribed via [employer] Providers Seen During Your Hospitalization Provider Specialty Primary office phone Harmony Aponte MD Urology [phone] Your Primary Care Physician (PCP) Primary Care Physician Office Phone Office Fax NONE ** None ** ** None ** You are allergic to the following Allergen Reactions Nitrofurantoin Macrocrystalline Rash Shellfish Containing Products Hives Rash Recent Documentation Height Weight BMI OB Status Smoking Status 1.575 m 72.6 kg 29.26 kg/m2 Hysterectomy Never Smoker Emergency Contacts Name Discharge Info Relation Home Work Mobile [address] [facility] CAREGIVER [3] Spouse [3] [phone] Patient Belongings The following personal items are in your possession at time of discharge: 
  Dental Appliances: None  Visual Aid: None          Jewelry: None  Clothing: Shirt, Pants, Footwear    Other Valuables: Cell Phone, [employer] Please provide this summary of care documentation to your next provider. Signatures-by signing, you are acknowledging that this After Visit Summary has been reviewed with you and you have received a copy. Patient Signature:  ____________________________________________________________ Date:  ____________________________________________________________  
  
[facility] Provider Signature:  ____________________________________________________________ Date:  ____________________________________________________________

## 2018-09-08 NOTE — OP NOTES
[facility] REPORT    Colleemanuela Lipoma  MR#: [id number]  : 1950  ACCOUNT #: [de-identified]   DATE OF SERVICE: 2018    PREOPERATIVE DIAGNOSIS:  Stress urinary incontinence. POSTOPERATIVE DIAGNOSIS:  Stress urinary incontinence. PROCEDURE PERFORMED:  Anterior autologous rectus sling cystourethropexy with cystoscopy. ANESTHESIA:  General by GET.    ESTIMATED BLOOD LOSS:  50 mL. SPECIMENS REMOVED:  None. COMPLICATIONS:  None. ASSISTANTS:  None. IMPLANTS: none    SURGEON:  Loly Billy MD     INDICATIONS FOR OPERATION:  A 69-year-old female with 4-5 pad per day stress urinary incontinence, not obtaining relief from strenuous performance of vigorous Kegel exercise program.  Cystoscopy and preoperative speculum exam reveals normal bladder findings with classic anterior vaginal wall hypermobility and demonstrable positive Neel Baker test in the supine position. She was now admitted for anterior rectus sling cystourethropexy. DESCRIPTION OF OPERATION:  After establishing adequate general anesthesia by GET the patient was placed in the dorsal lithotomy position, her legs suspended in Lorenzo stirrups, cushioned with soft rubber padding. The external genitalia and lower abdomen were then prepped with antibiotic scrub and solution and draped in a sterile manner. A 3-inch incision was made in the suprapubic region above the pubic hairline, carried down through the subcutaneous fascia, the subcutaneous fat, exposing the anterior rectus fascia. A strip of fascia measuring 1/2 inch x 5 inches was then harvested with the defect closed with running and interrupted #1 chromic suture. A Chandler catheter was then passed into the bladder, balloon inflated to 5 mL volume and the anterior vaginal wall was infiltrated with 0.5% Marcaine and epinephrine solution, injecting 10 mL into the anterior periurethral area to promote hemostasis.   A midline incision was then made over the urethra, 1 cm proximal to the urethral meatus carried down towards the bladder neck. The edges of the incision were then undermined with pointed hemostat allowing dissection laterally and anteriorly towards the posterior pubic bone surface, which was developed with the tips of Schnidt hemostat with a gentle dissected through the periurethral tissue into the retropubic space of Retzius. The sling obtained from the rectus fascia was then fashioned with 2 long #1 chromic suture ties on each end. The chromic suture ties were then placed through the eye of a 30-degree Stamey needle and the Stamey needle was directed through the vaginal incision periurethrally anteriorly behind the pubic bone and punched through the rectus muscle delivering the #1 chromic suture attached to the rectus sling to the pubic incision. The opposite limb was then passed through the left periurethral tissue and brought up, punched through the rectus muscle and grasped. The sling was then placed so that the mid portion straddled the mid urethra and the bladder was filled to capacity with saline solution. On removal of the Chandler catheter, cystoscopy was performed showing no sign of bladder perforation by the Stamey needle or sling sutures. The bladder was filled to capacity and on withdrawal of the cystoscope, a forceful urinary stream was observed which was interrupted completely by light upward traction on the sling sutures. The sling sutures were then tied together across the midline and cystoscopy was repeated confirming the absence of bladder or urethral perforation. The full bladder allowed a trickle of urine to issue from the urethra on removal of the cystoscope. The Chandler catheter was then passed back into the bladder. Balloon inflated to 10 mL volume and placed to gravity drainage.   The midline vaginal incision was closed with 3 interrupted vertical mattress sutures using 0 chromic material.  The skin edges of the suprapubic incision were closed with subcutaneous 3-0 Vicryl. A gauze dressing and perforated paper tape holding the gauze dressing on the suprapubic incision. Vaginal packing was then put in previously soaked in  irrigant solution. This will be left in for several days postop. The patient tolerated the procedure well and was brought from the operating room to the PACU in good condition. The Chandler catheter and vaginal packing will remain in place for 48 hours and removed in the office during a postoperative visit.       MD NICOLE Bassett / [place]  D: 09/07/2018 14:06     T: 09/08/2018 09:05  JOB #: [id number]

## 2018-09-10 ENCOUNTER — OFFICE VISIT (OUTPATIENT)
Dept: UROLOGY | Age: 68
End: 2018-09-10

## 2018-09-10 VITALS — WEIGHT: 160 LBS | BODY MASS INDEX: 29.44 KG/M2 | HEIGHT: 62 IN

## 2018-09-10 DIAGNOSIS — R33.9 URINARY RETENTION: ICD-10-CM

## 2018-09-10 DIAGNOSIS — N39.3 SUI (STRESS URINARY INCONTINENCE), MALE: Primary | ICD-10-CM

## 2018-09-10 RX ORDER — OXYCODONE HYDROCHLORIDE 5 MG/1
5 TABLET ORAL
Qty: 12 TAB | Refills: 0 | Status: SHIPPED | OUTPATIENT
Start: 2018-09-10 | End: 2018-10-31

## 2018-09-10 NOTE — PROGRESS NOTES
Ms. Freedom Vivar has a reminder for a \"due or due soon\" health maintenance. I have asked that she contact her primary care provider for follow-up on this health maintenance. RBV Per Coty Torres urethral meatus cleansed with betadine,  18 straight catheter was inserted into bladder, balloon inflated with 10 cc of sterile water, catheter connected to leg bag and attached to patient's  leg. Patient tolerated well.

## 2018-09-10 NOTE — PATIENT INSTRUCTIONS
Learning About Surgeries for Stress Urinary Incontinence in Women  What is stress urinary incontinence? Stress urinary incontinence (stress incontinence) is the leaking of urine when you sneeze, cough, laugh, jog, or lift something heavy. These actions put pressure on your bladder and urethra. The urethra is the tube that carries urine from the bladder and out of the body. Stress incontinence is the most common type of urinary incontinence in women. What causes it? Stress incontinence can be caused by childbirth, weight gain, or other conditions that stretch the pelvic floor muscles. When these muscles no longer support your bladder enough, the bladder drops down and pushes against the vagina. This prevents the muscles that close off the urethra from squeezing as they should. This leads to leaking. How does surgery fix it? Surgery lifts the urethra and bladder into their normal position. This helps the urethra close tightly. It keeps urine from leaking. What types of surgery are used? There are several types of surgery. The type you have depends on your medical problem and your overall health. Talk with your doctor about which one is right for you. Tension-free vaginal tape (TVT) surgery  The doctor puts a mesh tape under the urethra like a sling or a hammock. It supports the urethra and returns it to its normal position. The doctor puts in the tape through small cuts (incisions) in your vagina and pubic hairline. This can also be done if urine leakage comes back after you have another type of incontinence surgery. Other sling surgeries are done in a way that is like TVT surgery. Transobturator tape (TOT) surgery is donealmost as often as TVT. But it's done in a slightly different way. Retropubic suspension  The doctor attaches the sagging bladder and urethra to the pubic bone or to strong ligaments in the pelvis. This returns the bladder and urethra to their normal position.  This can be done through a few small cuts in your belly. Or it may be done through one larger cut in your lower belly. Urethral sling  The doctor creates a sling out of a piece of muscle, ligament, or tendon. Or the doctor can use man-made material. The sling lifts the urethra back into a normal position. This can be done through a few small cuts in your belly. Or it may be done through one larger cut in your lower belly. What can you expect after surgery? Recovery can take 4 to 6 weeks. You will need help around the house during this time. You will not be able to do any heavy lifting or strenuous activities for 4 to 6 weeks. You will probably need to take off work at least 2 to 6 weeks. It depends on which type of surgery you have. You may feel more tired than usual. This can last for up to several weeks. After surgery you should have less or no urine leakage when you sneeze, cough, laugh, or exercise. At first you may find that it is harder than usual to empty your bladder. This usually improves within several weeks. Follow-up care is a key part of your treatment and safety. Be sure to make and go to all appointments, and call your doctor if you are having problems. It's also a good idea to know your test results and keep a list of the medicines you take. Where can you learn more? Go to http://harjit-jennifer.info/. Enter W221 in the search box to learn more about \"Learning About Surgeries for Stress Urinary Incontinence in Women. \"  Current as of: October 6, 2017  Content Version: 11.7  © 6453-0638 [employer]. Care instructions adapted under license by [employer] (which disclaims liability or warranty for this information). If you have questions about a medical condition or this instruction, always ask your healthcare professional. [address] any warranty or liability for your use of this information.

## 2018-09-10 NOTE — MR AVS SNAPSHOT
[facility] [place] [address] [address] 
[phone] Patient: Maria Luisa Vaughn MRN: [id number] Visit Information Date & Time Provider Department Dept. Phone Encounter #  
 9/10/2018  1:45 PM [facility] [phone] [id number] Your Appointments 9/17/2018  3:30 PM  
ULTRASOUND with Gene Miner MD  
[facility] Appt Note: PVR after catheter removal  
 [address] [place] [address] [address]  
[phone] [address] [place] [address] Upcoming Health Maintenance Date Due Hepatitis C Screening 1950 DTaP/Tdap/Td series (1 - Tdap) 5/26/1971 FOBT Q 1 YEAR AGE 50-75 5/26/2000 ZOSTER VACCINE AGE 60> 3/26/2010 BREAST CANCER SCRN MAMMOGRAM 2/18/2015 GLAUCOMA SCREENING Q2Y 5/26/2015 Bone Densitometry (Dexa) Screening 5/26/2015 Pneumococcal 65+ Low/Medium Risk (1 of 2 - PCV13) 5/26/2015 MEDICARE YEARLY EXAM 6/21/2018 Influenza Age 5 to Adult 8/1/2018 Allergies as of 9/10/2018  Review Complete On: 9/10/2018 By: Gene Miner MD  
  
 Severity Noted Reaction Type Reactions Nitrofurantoin Macrocrystalline Medium 10/09/2012    Rash Shellfish Containing Products Medium 11/05/2012    Hives, Rash Current Immunizations  Never Reviewed No immunizations on file. Not reviewed this visit You Were Diagnosed With   
  
 Codes Comments HAYDEN (stress urinary incontinence), male    -  Primary ICD-10-CM: N39.3 ICD-9-CM: 788.32 Vitals Height(growth percentile) Weight(growth percentile) BMI OB Status Smoking Status 5' 2\" (1.575 m) 160 lb (72.6 kg) 29.26 kg/m2 Hysterectomy Never Smoker BMI and BSA Data Body Mass Index Body Surface Area  
 29.26 kg/m 2 1.78 m 2 Preferred Pharmacy Pharmacy Name Phone [phone] [phone] Your Updated Medication List  
  
   
This list is accurate as of 9/10/18  3:01 PM.  Always use your most recent med list.  
  
  
  
  
 ascorbic acid (vitamin C) 250 mg tablet Commonly known as:  VITAMIN C  
500 mg. BIFIDOBACTERIUM INFANTIS PO Take  by Mouth Once a Day. ciprofloxacin HCl 500 mg tablet Commonly known as:  CIPRO Take 1 Tab by mouth two (2) times a day for 5 days. docusate sodium 100 mg capsule Commonly known as:  Wilbert Pata Take 1 Cap by mouth two (2) times a day for 5 days. FISH OIL 60- mg Cap Generic drug:  omega 3-dha-epa-fish oil 300 mg.  
  
 losartan 100 mg tablet Commonly known as:  COZAAR  
daily. melatonin 10 mg Tab Take  by mouth nightly as needed. MILK THISTLE PO  
1,000 mg.  
  
 omeprazole 20 mg capsule Commonly known as:  PRILOSEC  
  
 * oxyCODONE IR 5 mg immediate release tablet Commonly known as:  Gevena Landsberg Take 1 Tab by mouth every six (6) hours as needed for Pain. Max Daily Amount: 20 mg.  
  
 * oxyCODONE IR 5 mg immediate release tablet Commonly known as:  Gevena Landsberg Take 1 Tab by mouth every four (4) hours as needed for Pain. Max Daily Amount: 30 mg. PSYLLIUM SEED (WITH DEXTROSE) PO Take  by Mouth Once a Day. Indications: Pt taking 2 capsule po every day  
  
 therapeutic multivitamin tablet Commonly known as:  [facility] Take 1 Tab by Mouth Once a Day. vitamin e 200 unit capsule Commonly known as:  AQUA GEMS  
200 Units. * Notice: This list has 2 medication(s) that are the same as other medications prescribed for you. Read the directions carefully, and ask your doctor or other care provider to review them with you. Prescriptions Printed  Refills  
 oxyCODONE IR (ROXICODONE) 5 mg immediate release tablet 0  
 Sig: Take 1 Tab by mouth every four (4) hours as needed for Pain. Max Daily Amount: 30 mg.  
 Class: Print Route: Oral  
  
Patient Instructions Learning About Surgeries for Stress Urinary Incontinence in Women What is stress urinary incontinence? Stress urinary incontinence (stress incontinence) is the leaking of urine when you sneeze, cough, laugh, jog, or lift something heavy. These actions put pressure on your bladder and urethra. The urethra is the tube that carries urine from the bladder and out of the body. Stress incontinence is the most common type of urinary incontinence in women. What causes it? Stress incontinence can be caused by childbirth, weight gain, or other conditions that stretch the pelvic floor muscles. When these muscles no longer support your bladder enough, the bladder drops down and pushes against the vagina. This prevents the muscles that close off the urethra from squeezing as they should. This leads to leaking. How does surgery fix it? Surgery lifts the urethra and bladder into their normal position. This helps the urethra close tightly. It keeps urine from leaking. What types of surgery are used? There are several types of surgery. The type you have depends on your medical problem and your overall health. Talk with your doctor about which one is right for you. Tension-free vaginal tape (TVT) surgery The doctor puts a mesh tape under the urethra like a sling or a hammock. It supports the urethra and returns it to its normal position. The doctor puts in the tape through small cuts (incisions) in your vagina and pubic hairline. This can also be done if urine leakage comes back after you have another type of incontinence surgery. Other sling surgeries are done in a way that is like TVT surgery. Transobturator tape (TOT) surgery is donealmost as often as TVT. But it's done in a slightly different way. Retropubic suspension The doctor attaches the sagging bladder and urethra to the pubic bone or to strong ligaments in the pelvis. This returns the bladder and urethra to their normal position. This can be done through a few small cuts in your belly. Or it may be done through one larger cut in your lower belly. Urethral sling The doctor creates a sling out of a piece of muscle, ligament, or tendon. Or the doctor can use man-made material. The sling lifts the urethra back into a normal position. This can be done through a few small cuts in your belly. Or it may be done through one larger cut in your lower belly. What can you expect after surgery? Recovery can take 4 to 6 weeks. You will need help around the house during this time. You will not be able to do any heavy lifting or strenuous activities for 4 to 6 weeks. You will probably need to take off work at least 2 to 6 weeks. It depends on which type of surgery you have. You may feel more tired than usual. This can last for up to several weeks. After surgery you should have less or no urine leakage when you sneeze, cough, laugh, or exercise. At first you may find that it is harder than usual to empty your bladder. This usually improves within several weeks. Follow-up care is a key part of your treatment and safety. Be sure to make and go to all appointments, and call your doctor if you are having problems. It's also a good idea to know your test results and keep a list of the medicines you take. Where can you learn more? Go to http://harjit-jennifer.info/. Enter F575 in the search box to learn more about \"Learning About Surgeries for Stress Urinary Incontinence in Women. \" Current as of: October 6, 2017 Content Version: 11.7 © 6770-3375 [employer]. Care instructions adapted under license by [employer] (which disclaims liability or warranty for this information).  If you have questions about a medical condition or this instruction, always ask your healthcare professional. [place] [address] any warranty or liability for your use of this information. Introducing [facility] SERVICES! [employer] introduces [employer] patient portal. Now you can access parts of your medical record, email your doctor's office, and request medication refills online. 1. In your internet browser, go to [email or web address] 2. Click on the First Time User? Click Here link in the Sign In box. You will see the New Member Sign Up page. 3. Enter your [employer] Access Code exactly as it appears below. You will not need to use this code after youve completed the sign-up process. If you do not sign up before the expiration date, you must request a new code. · [employer] Access Code: [email or web address] Expires: 9/19/2018 10:34 AM 
 
4. Enter the last four digits of your Social Security Number (xxxx) and Date of Birth (mm/dd/yyyy) as indicated and click Submit. You will be taken to the next sign-up page. 5. Create a [employer] ID. This will be your [employer] login ID and cannot be changed, so think of one that is secure and easy to remember. 6. Create a [employer] password. You can change your password at any time. 7. Enter your Password Reset Question and Answer. This can be used at a later time if you forget your password. 8. Enter your e-mail address. You will receive e-mail notification when new information is available in [address] 9. Click Sign Up. You can now view and download portions of your medical record. 10. Click the Download Summary menu link to download a portable copy of your medical information. If you have questions, please visit the Frequently Asked Questions section of the [employer] website. Remember, [employer] is NOT to be used for urgent needs. For medical emergencies, dial 911. Now available from your iPhone and Android! Please provide this summary of care documentation to your next provider. Your primary care clinician is listed as NONE. If you have any questions after today's visit, please call [phone].

## 2018-09-11 NOTE — PROGRESS NOTES
Linden Moreno 76 y.o. female     Ms. Kayla Zazueta seen today for removal of vaginal packing and assessment of voiding efficiency status post rectus fascial sling cystourethropexy on 2018    Chandler catheter left indwelling for 72 hours postop-Chandler removed by patient this morning/she has voided twice since Chandler catheter removal     PVR today 425 cc    involuntary loss of bladder control when performing Valsalva inducing physical exertions worsening slowly over the past 3-5 years-now using one pad per day  Kegel exercise program has been ineffective in relieving HAYDEN      History of  tract disease, trauma, or surgery  No dysuria urgency or urgency incontinence  No neurologic symptoms      [facility] 1998  Anterior-posterior repair of pelvic laxity  Dr. Lis Mckeon      Review of Systems:   CNS: No seizure syncope headaches dizziness or visual changes  Respiratory: No wheezing cough shortness of breath or chest pain  Cardiovascular: No angina no palpitations  Intestinal: No dyspepsia diarrhea or constipation  Urinary: No urgency frequency dysuria or hematuria  Skeletal: No bone or joint pain  Endocrine: No diabetes or thyroid disease  Other:                                                                                    3 para 2  ×2  (max birth weight 7.4)                  Allergies: Allergies   Allergen Reactions    Nitrofurantoin Macrocrystalline Rash    Shellfish Containing Products Hives and Rash      Medications:    Current Outpatient Prescriptions   Medication Sig Dispense Refill    oxyCODONE IR (ROXICODONE) 5 mg immediate release tablet Take 1 Tab by mouth every four (4) hours as needed for Pain. Max Daily Amount: 30 mg. 12 Tab 0    docusate sodium (COLACE) 100 mg capsule Take 1 Cap by mouth two (2) times a day for 5 days. 10 Cap 2    ciprofloxacin HCl (CIPRO) 500 mg tablet Take 1 Tab by mouth two (2) times a day for 5 days.  10 Tab 0    oxyCODONE IR (ROXICODONE) 5 mg immediate release tablet Take 1 Tab by mouth every six (6) hours as needed for Pain. Max Daily Amount: 20 mg. 12 Tab 0    melatonin 10 mg tab Take  by mouth nightly as needed.  losartan (COZAAR) 100 mg tablet daily.  omeprazole (PRILOSEC) 20 mg capsule       therapeutic multivitamin (THERAGRAN) tablet Take 1 Tab by Mouth Once a Day.  ascorbic acid, vitamin C, (VITAMIN C) 250 mg tablet 500 mg.      omega 3-dha-epa-fish oil (FISH OIL) 60- mg cap 300 mg.      vitamin e (AQUA GEMS) 200 unit capsule 200 Units.  BIFIDOBACTERIUM INFANTIS PO Take  by Mouth Once a Day.  PSYLLIUM SEED, WITH DEXTROSE, PO Take  by Mouth Once a Day. Indications: Pt taking 2 capsule po every day      MILK THISTLE PO 1,000 mg. 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      Social History Main Topics    Smoking status: Never Smoker    Smokeless tobacco: Never Used    Alcohol use Yes      Comment: daliy glass of wine    Drug use: No    Sexual activity: Yes     Other Topics Concern    Not on file     Social History Narrative      Family History   Problem Relation Age of Onset    Cancer Mother     Colon Cancer Mother     Heart Disease Father     Hypertension Father    [facility] Cancer Maternal Aunt     Diabetes Paternal Grandmother         Physical Examination: well Nourished mature female in no apparent distress    Abdomen-nontender no distention  Pfannenstiel incision is clean and dry-nontender  With mild peripheral erythema-no drainage    Vaginal packing was removed today    PVR today 425 cc    Procedure Note                                                                       Chandler Catheter Replacement    After prepping the introitus with Betadine solution a lubricated 18 French Chandler catheter was passed into the bladder through the urethra balloon inflated to 10 cc volume with sterile water draining 500 cc of anai clear urine into a leg bag-procedure was uncomplicated well-tolerated  EBL.-none  Specimen-noneMore than 1/2 of this 15minute visit was spent in counselling and coordination of care, as described above. Impression: Mild retention 3 days postop rectus fascial sling cystourethropexy      Plan: Replacement of indwelling Chandler catheter ×1 week (Juan José Ingram due to hit region and 2 days)    Rx oxycodone 5 mg every 4 hours as needed pain #12    RTC 1 week see 6 hours after removal of Chandler catheter for PVR        Karen Sousa MD  -electronically signed-    PLEASE NOTE:  This document has been produced using voice recognition software. Unrecognized errors in transcription may be present.

## 2018-09-17 ENCOUNTER — OFFICE VISIT (OUTPATIENT)
Dept: UROLOGY | Age: 68
End: 2018-09-17

## 2018-09-17 ENCOUNTER — HOSPITAL ENCOUNTER (OUTPATIENT)
Dept: LAB | Age: 68
Discharge: HOME | End: 2018-09-17
Payer: MEDICARE

## 2018-09-17 VITALS
DIASTOLIC BLOOD PRESSURE: 78 MMHG | SYSTOLIC BLOOD PRESSURE: 142 MMHG | HEIGHT: 62 IN | HEART RATE: 90 BPM | OXYGEN SATURATION: 96 %

## 2018-09-17 DIAGNOSIS — R31.29 MICROSCOPIC HEMATURIA: ICD-10-CM

## 2018-09-17 DIAGNOSIS — R33.9 RETENTION OF URINE: ICD-10-CM

## 2018-09-17 DIAGNOSIS — N39.3 SUI (STRESS URINARY INCONTINENCE, FEMALE): Primary | ICD-10-CM

## 2018-09-17 LAB
BILIRUB UR QL STRIP: NEGATIVE
GLUCOSE UR-MCNC: NEGATIVE MG/DL
KETONES P FAST UR STRIP-MCNC: NEGATIVE MG/DL
PH UR STRIP: 5.5 [PH] (ref 4.6–8)
PROT UR QL STRIP: NEGATIVE
SP GR UR STRIP: 1.01 (ref 1–1.03)
UA UROBILINOGEN AMB POC: NORMAL (ref 0.2–1)
URINALYSIS CLARITY POC: CLEAR
URINALYSIS COLOR POC: YELLOW
URINE BLOOD POC: NORMAL
URINE LEUKOCYTES POC: NORMAL
URINE NITRITES POC: NEGATIVE

## 2018-09-17 PROCEDURE — 87086 URINE CULTURE/COLONY COUNT: CPT | Performed by: UROLOGY

## 2018-09-17 NOTE — PATIENT INSTRUCTIONS
Urinary Retention: Care Instructions  Your Care Instructions    Urinary retention means that you aren't able to urinate. In men, it is often caused by a blockage of the urinary tract from an enlarged prostate gland. In men and women, it can also be caused by an infection or nerve damage. Or it may be a side effect of a medicine. The doctor may have drained the urine from your bladder. If you still have problems passing urine, you may need to use a catheter at home. This is used to empty your bladder until the problem can be fixed. Your doctor may put a catheter in your bladder before you go home. If so, he or she will tell you when to come back to have the catheter removed. The doctor has checked you closely. But problems can develop later. If you notice any problems or new symptoms, get medical treatment right away. Follow-up care is a key part of your treatment and safety. Be sure to make and go to all appointments, and call your doctor if you are having problems. It's also a good idea to know your test results and keep a list of the medicines you take. How can you care for yourself at home? · Take your medicines exactly as prescribed. Call your doctor if you think you are having a problem with your medicine. You will get more details on the specific medicines your doctor prescribes. · Check with your doctor before you use any over-the-counter medicines. Many cold and allergy medicines, for example, can make this problem worse. Make sure your doctor knows all of the medicines, vitamins, supplements, and herbal remedies you take. · Spread out through the day the amount of fluid you drink. Do not drink a lot at bedtime. · Avoid alcohol and caffeine. · If you have been given a catheter, or if one is already in place, follow the instructions you were given. Always wash your hands before and after you handle the catheter. When should you call for help?   Call your doctor now or seek immediate medical care if:    · You cannot urinate at all, or it is getting harder to urinate.     · You have symptoms of a urinary tract infection. These may include:  ¨ Pain or burning when you urinate. ¨ A frequent need to urinate without being able to pass much urine. ¨ Pain in the flank, which is just below the rib cage and above the waist on either side of the back. ¨ Blood in your urine. ¨ A fever.    Watch closely for changes in your health, and be sure to contact your doctor if:    · You have any problems with your catheter.     · You do not get better as expected. Where can you learn more? Go to http://harjit-jennifer.info/. Enter M244 in the search box to learn more about \"Urinary Retention: Care Instructions. \"  Current as of: May 12, 2017  Content Version: 11.7  © 6579-1057 [employer]. Care instructions adapted under license by [employer] (which disclaims liability or warranty for this information). If you have questions about a medical condition or this instruction, always ask your healthcare professional. [place] [address] any warranty or liability for your use of this information.

## 2018-09-17 NOTE — MR AVS SNAPSHOT
[address] [facility] [address] [address] 
[phone] Patient: Jose Manuel Colvin MRN: [id number] Visit Information Date & Time Provider Department Dept. Phone Encounter #  
 2018  3:30 PM Juan Mcdaniel [facility] [phone] [id number] Your Appointments 2018  2:45 PM  
ULTRASOUND with Kate Marin MD  
[facility] [address] [place] [address]) [address] [facility] [address] [address]  
[phone] [address] [place] [address] Upcoming Health Maintenance Date Due Hepatitis C Screening 1950 DTaP/Tdap/Td series (1 - Tdap) 1971 FOBT Q 1 YEAR AGE 50-75 2000 ZOSTER VACCINE AGE 60> 3/26/2010 BREAST CANCER SCRN MAMMOGRAM 2015 GLAUCOMA SCREENING Q2Y 2015 Bone Densitometry (Dexa) Screening 2015 Pneumococcal 65+ Low/Medium Risk (1 of 2 - PCV13) 2015 MEDICARE YEARLY EXAM 2018 Influenza Age 5 to Adult 2018 Allergies as of 2018  Review Complete On: 2018 By: Flores Avila LPN Severity Noted Reaction Type Reactions Nitrofurantoin Macrocrystalline Medium 10/09/2012    Rash Shellfish Containing Products Medium 2012    Hives, Rash Current Immunizations  Never Reviewed No immunizations on file. Not reviewed this visit You Were Diagnosed With   
  
 Codes Comments Retention of urine    -  Primary ICD-10-CM: R33.9 ICD-9-CM: 788.20 Microscopic hematuria     ICD-10-CM: R31.29 ICD-9-CM: 599.72 Vitals BP Pulse Height(growth percentile) SpO2 OB Status Smoking Status 142/78 (BP 1 Location: Left arm, BP Patient Position: Sitting) 90 5' 2\" (1.575 m) 96% Hysterectomy Never Smoker Vitals History Preferred Pharmacy Pharmacy Name Phone [phone] [address] [phone] Your Updated Medication List  
  
   
This list is accurate as of 9/17/18  4:25 PM.  Always use your most recent med list.  
  
  
  
  
 ascorbic acid (vitamin C) 250 mg tablet Commonly known as:  VITAMIN C  
500 mg. BIFIDOBACTERIUM INFANTIS PO Take  by Mouth Once a Day. FISH OIL 60- mg Cap Generic drug:  omega 3-dha-epa-fish oil 300 mg.  
  
 losartan 100 mg tablet Commonly known as:  COZAAR  
daily. melatonin 10 mg Tab Take  by mouth nightly as needed. MILK THISTLE PO  
1,000 mg.  
  
 omeprazole 20 mg capsule Commonly known as:  PRILOSEC  
  
 * oxyCODONE IR 5 mg immediate release tablet Commonly known as:  Chares Cony Take 1 Tab by mouth every six (6) hours as needed for Pain. Max Daily Amount: 20 mg.  
  
 * oxyCODONE IR 5 mg immediate release tablet Commonly known as:  Chares Cony Take 1 Tab by mouth every four (4) hours as needed for Pain. Max Daily Amount: 30 mg. PSYLLIUM SEED (WITH DEXTROSE) PO Take  by Mouth Once a Day. Indications: Pt taking 2 capsule po every day  
  
 therapeutic multivitamin tablet Commonly known as:  [facility] Take 1 Tab by Mouth Once a Day. vitamin e 200 unit capsule Commonly known as:  AQUA GEMS  
200 Units. * Notice: This list has 2 medication(s) that are the same as other medications prescribed for you. Read the directions carefully, and ask your doctor or other care provider to review them with you. We Performed the Following AMB POC URINALYSIS DIP STICK AUTO W/O MICRO [41426 CPT(R)] Patient Instructions Urinary Retention: Care Instructions Your Care Instructions Urinary retention means that you aren't able to urinate. In men, it is often caused by a blockage of the urinary tract from an enlarged prostate gland.  In men and women, it can also be caused by an infection or nerve damage. Or it may be a side effect of a medicine. The doctor may have drained the urine from your bladder. If you still have problems passing urine, you may need to use a catheter at home. This is used to empty your bladder until the problem can be fixed. Your doctor may put a catheter in your bladder before you go home. If so, he or she will tell you when to come back to have the catheter removed. The doctor has checked you closely. But problems can develop later. If you notice any problems or new symptoms, get medical treatment right away. Follow-up care is a key part of your treatment and safety. Be sure to make and go to all appointments, and call your doctor if you are having problems. It's also a good idea to know your test results and keep a list of the medicines you take. How can you care for yourself at home? · Take your medicines exactly as prescribed. Call your doctor if you think you are having a problem with your medicine. You will get more details on the specific medicines your doctor prescribes. · Check with your doctor before you use any over-the-counter medicines. Many cold and allergy medicines, for example, can make this problem worse. Make sure your doctor knows all of the medicines, vitamins, supplements, and herbal remedies you take. · Spread out through the day the amount of fluid you drink. Do not drink a lot at bedtime. · Avoid alcohol and caffeine. · If you have been given a catheter, or if one is already in place, follow the instructions you were given. Always wash your hands before and after you handle the catheter. When should you call for help? Call your doctor now or seek immediate medical care if: 
  · You cannot urinate at all, or it is getting harder to urinate.  
  · You have symptoms of a urinary tract infection. These may include: 
¨ Pain or burning when you urinate. ¨ A frequent need to urinate without being able to pass much urine. ¨ Pain in the flank, which is just below the rib cage and above the waist on either side of the back. ¨ Blood in your urine. ¨ A fever.  
 Watch closely for changes in your health, and be sure to contact your doctor if: 
  · You have any problems with your catheter.  
  · You do not get better as expected. Where can you learn more? Go to http://harjit-jennifer.info/. Enter M244 in the search box to learn more about \"Urinary Retention: Care Instructions. \" Current as of: May 12, 2017 Content Version: 11.7 © 8968-1183 [employer]. Care instructions adapted under license by [employer] (which disclaims liability or warranty for this information). If you have questions about a medical condition or this instruction, always ask your healthcare professional. [address] any warranty or liability for your use of this information. Introducing [facility]! [facility] introduces Medstro patient portal. Now you can access parts of your medical record, email your doctor's office, and request medication refills online. 1. In your internet browser, go to [email or web address] 2. Click on the First Time User? Click Here link in the Sign In box. You will see the New Member Sign Up page. 3. Enter your Medstro Access Code exactly as it appears below. You will not need to use this code after youve completed the sign-up process. If you do not sign up before the expiration date, you must request a new code. · Medstro Access Code: [email or web address] Expires: 9/19/2018 10:34 AM 
 
4. Enter the last four digits of your Social Security Number (xxxx) and Date of Birth (mm/dd/yyyy) as indicated and click Submit. You will be taken to the next sign-up page. 5. Create a Medstro ID. This will be your Medstro login ID and cannot be changed, so think of one that is secure and easy to remember. 6. Create a Upplication password. You can change your password at any time. 7. Enter your Password Reset Question and Answer. This can be used at a later time if you forget your password. 8. Enter your e-mail address. You will receive e-mail notification when new information is available in [address] 9. Click Sign Up. You can now view and download portions of your medical record. 10. Click the Download Summary menu link to download a portable copy of your medical information. If you have questions, please visit the Frequently Asked Questions section of the Upplication website. Remember, Upplication is NOT to be used for urgent needs. For medical emergencies, dial 911. Now available from your iPhone and Android! Please provide this summary of care documentation to your next provider. Your primary care clinician is listed as NONE. If you have any questions after today's visit, please call [phone].

## 2018-09-17 NOTE — PROGRESS NOTES
Ms. Vy Nicholas has a reminder for a \"due or due soon\" health maintenance. I have asked that she contact her primary care provider for follow-up on this health maintenance. RBV per Dr. Anamika Barone urine sent for culture.

## 2018-09-18 NOTE — PROGRESS NOTES
Hipolito Hall 76 y.o. female     Ms. Kathy Winston seen today for evaluation of voiding efficiency status post rectus fascial sling cystourethropexy on 2018  Patient removed Chandler catheter 6 hours ago and has voided several times since then-here today for PVR assessment      cc on 10 September 2018     involuntary loss of bladder control when performing Valsalva inducing physical exertions worsening slowly over the past 3-5 years-now using one pad per day  Kegel exercise program has been ineffective in relieving HAYDEN      History of  tract disease, trauma, or surgery  No dysuria urgency or urgency incontinence  No neurologic symptoms      [facility] 1998  Anterior-posterior repair of pelvic laxity  Dr. Tray Manzano      Review of Systems:   CNS: No seizure syncope headaches dizziness or visual changes  Respiratory: No wheezing cough shortness of breath or chest pain  Cardiovascular: No angina no palpitations  Intestinal: No dyspepsia diarrhea or constipation  Urinary: No urgency frequency dysuria or hematuria  Skeletal: No bone or joint pain  Endocrine: No diabetes or thyroid disease  Other:                                                                                    3 para 2  ×2  (max birth weight 7.4)        Allergies: Allergies   Allergen Reactions    Nitrofurantoin Macrocrystalline Rash    Shellfish Containing Products Hives and Rash      Medications:    Current Outpatient Prescriptions   Medication Sig Dispense Refill    melatonin 10 mg tab Take  by mouth nightly as needed.  losartan (COZAAR) 100 mg tablet daily.  omeprazole (PRILOSEC) 20 mg capsule       therapeutic multivitamin (THERAGRAN) tablet Take 1 Tab by Mouth Once a Day.  ascorbic acid, vitamin C, (VITAMIN C) 250 mg tablet 500 mg.      omega 3-dha-epa-fish oil (FISH OIL) 60- mg cap 300 mg.      vitamin e (AQUA GEMS) 200 unit capsule 200 Units.       PSYLLIUM SEED, WITH DEXTROSE, PO Take  by Mouth Once a Day. Indications: Pt taking 2 capsule po every day      MILK THISTLE PO 1,000 mg.      oxyCODONE IR (ROXICODONE) 5 mg immediate release tablet Take 1 Tab by mouth every four (4) hours as needed for Pain. Max Daily Amount: 30 mg. 12 Tab 0    oxyCODONE IR (ROXICODONE) 5 mg immediate release tablet Take 1 Tab by mouth every six (6) hours as needed for Pain. Max Daily Amount: 20 mg. 12 Tab 0    BIFIDOBACTERIUM INFANTIS PO Take  by Mouth Once a Day. 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      Social History Main Topics    Smoking status: Never Smoker    Smokeless tobacco: Never Used    Alcohol use Yes      Comment: daliy glass of wine    Drug use: No    Sexual activity: Yes     Other Topics Concern    Not on file     Social History Narrative      Family History   Problem Relation Age of Onset    Cancer Mother     Colon Cancer Mother     Heart Disease Father     Hypertension Father     Cancer Maternal Aunt     Diabetes Paternal Grandmother         Physical Examination: Nourished mature female in no apparent distress    Pfannenstiel incision is clean dry and healing very well by primary intention      Urinalysis: ++heme/negative nitrite      PVR today 106 cc    Impression: Stable status post anterior rectus sling cystourethropexy    Plan: RTC 3 days PVR assessment      More than 1/2 of this 15 minute visit was spent in counselling and coordination of care, as described above. Nadira Quintanilla MD  -electronically signed-    PLEASE NOTE:  This document has been produced using voice recognition software. Unrecognized errors in transcription may be present.

## 2018-09-20 ENCOUNTER — OFFICE VISIT (OUTPATIENT)
Dept: UROLOGY | Age: 68
End: 2018-09-20

## 2018-09-20 VITALS
WEIGHT: 160 LBS | HEIGHT: 62 IN | DIASTOLIC BLOOD PRESSURE: 80 MMHG | HEART RATE: 79 BPM | SYSTOLIC BLOOD PRESSURE: 143 MMHG | BODY MASS INDEX: 29.44 KG/M2 | OXYGEN SATURATION: 97 %

## 2018-09-20 DIAGNOSIS — N39.3 SUI (STRESS URINARY INCONTINENCE, FEMALE): Primary | ICD-10-CM

## 2018-09-20 LAB
BACTERIA SPEC CULT: NORMAL
BILIRUB UR QL STRIP: NEGATIVE
GLUCOSE UR-MCNC: NEGATIVE MG/DL
KETONES P FAST UR STRIP-MCNC: NEGATIVE MG/DL
PH UR STRIP: 6 [PH] (ref 4.6–8)
PROT UR QL STRIP: NEGATIVE
SERVICE CMNT-IMP: NORMAL
SP GR UR STRIP: 1.01 (ref 1–1.03)
UA UROBILINOGEN AMB POC: NORMAL (ref 0.2–1)
URINALYSIS CLARITY POC: CLEAR
URINALYSIS COLOR POC: YELLOW
URINE BLOOD POC: NORMAL
URINE LEUKOCYTES POC: NORMAL
URINE NITRITES POC: NEGATIVE

## 2018-09-20 RX ORDER — TAMSULOSIN HYDROCHLORIDE 0.4 MG/1
0.4 CAPSULE ORAL DAILY
Qty: 30 CAP | Refills: 3 | Status: SHIPPED | OUTPATIENT
Start: 2018-09-20

## 2018-09-20 NOTE — MR AVS SNAPSHOT
[address] [facility] [address] [address] 
[phone] Patient: Maria Luisa Vaughn MRN: [id number] EGI:[id number] Visit Information Date & Time Provider Department Dept. Phone Encounter #  
 9/20/2018  2:45 PM [facility] [phone] [id number] Your Appointments 9/26/2018  2:45 PM  
ULTRASOUND with Gene Miner MD  
[facility] Winifred Teague) Appt Note: PVR  
 [address] [facility] [address] [address]  
[phone] [address] [place] [address] Upcoming Health Maintenance Date Due Hepatitis C Screening 1950 DTaP/Tdap/Td series (1 - Tdap) 5/26/1971 FOBT Q 1 YEAR AGE 50-75 5/26/2000 ZOSTER VACCINE AGE 60> 3/26/2010 BREAST CANCER SCRN MAMMOGRAM 2/18/2015 GLAUCOMA SCREENING Q2Y 5/26/2015 Bone Densitometry (Dexa) Screening 5/26/2015 Pneumococcal 65+ Low/Medium Risk (1 of 2 - PCV13) 5/26/2015 MEDICARE YEARLY EXAM 6/21/2018 Influenza Age 5 to Adult 8/1/2018 Allergies as of 9/20/2018  Review Complete On: 9/20/2018 By: Gregor Murphy LPN Severity Noted Reaction Type Reactions Nitrofurantoin Macrocrystalline Medium 10/09/2012    Rash Shellfish Containing Products Medium 11/05/2012    Hives, Rash Current Immunizations  Never Reviewed No immunizations on file. Not reviewed this visit You Were Diagnosed With   
  
 Codes Comments HAYDEN (stress urinary incontinence, female)    -  Primary ICD-10-CM: N39.3 ICD-9-CM: 625.6 Vitals BP Pulse Height(growth percentile) Weight(growth percentile) SpO2 BMI  
 143/80 (BP 1 Location: Left arm, BP Patient Position: Sitting) 79 5' 2\" (1.575 m) 160 lb (72.6 kg) 97% 29.26 kg/m2 OB Status Smoking Status Hysterectomy Never Smoker Vitals History BMI and BSA Data Body Mass Index Body Surface Area  
 29.26 kg/m 2 1.78 m 2 Preferred Pharmacy Pharmacy Name Phone [facility] [address] [facility] [phone] Your Updated Medication List  
  
   
This list is accurate as of 9/20/18  3:16 PM.  Always use your most recent med list.  
  
  
  
  
 ascorbic acid (vitamin C) 250 mg tablet Commonly known as:  VITAMIN C  
500 mg. BIFIDOBACTERIUM INFANTIS PO Take  by Mouth Once a Day. FISH OIL 60- mg Cap Generic drug:  omega 3-dha-epa-fish oil 300 mg.  
  
 losartan 100 mg tablet Commonly known as:  COZAAR  
daily. melatonin 10 mg Tab Take  by mouth nightly as needed. MILK THISTLE PO  
1,000 mg.  
  
 omeprazole 20 mg capsule Commonly known as:  PRILOSEC  
  
 * oxyCODONE IR 5 mg immediate release tablet Commonly known as:  [employer] R[employer]ler Take 1 Tab by mouth every six (6) hours as needed for Pain. Max Daily Amount: 20 mg.  
  
 * oxyCODONE IR 5 mg immediate release tablet Commonly known as:  Endeavor Rattler Take 1 Tab by mouth every four (4) hours as needed for Pain. Max Daily Amount: 30 mg. PSYLLIUM SEED (WITH DEXTROSE) PO Take  by Mouth Once a Day. Indications: Pt taking 2 capsule po every day  
  
 therapeutic multivitamin tablet Commonly known as:  [facility] Take 1 Tab by Mouth Once a Day. vitamin e 200 unit capsule Commonly known as:  AQUA GEMS  
200 Units. * Notice: This list has 2 medication(s) that are the same as other medications prescribed for you. Read the directions carefully, and ask your doctor or other care provider to review them with you. We Performed the Following AMB POC URINALYSIS DIP STICK AUTO W/O MICRO [12861 CPT(R)] Patient Instructions Urine Test: About This Test 
What is it?  
 
A urine test checks the color, clarity (clear or cloudy), odor, concentration, and acidity (pH) of your urine. It also checks your levels of protein, sugar, blood cells, or other substances in your urine. This test is sometimes called a urinalysis. Why is this test done? A urine test may be done: · To check for a disease or infection of the urinary tract. The urinary tract includes the kidneys, the tubes that carry urine from the kidneys to the bladder (ureters), and the bladder. It also includes the tube that carries urine from the bladder to outside the body (urethra). · To check the treatment of conditions such as diabetes, kidney stones, a urinary tract infection (UTI), high blood pressure, or some kidney or liver diseases. How can you prepare for the test? 
· Before the test, don't eat foods that can change the color of your urine. Examples of these include blackberries, beets, and rhubarb. · Don't do heavy exercise before the test. 
· Tell your doctor if you are menstruating or close to starting your period. Your doctor may want to wait to do the test. 
· Tell your doctor about all the nonprescription and prescription medicines and herbs or other supplements you take. Some of these can affect the results of this test. 
What happens during the test? 
A urine test can be done in your doctor's office, clinic, or lab. Or you may be asked to collect a urine sample at home. Then you can take it to the office or lab for testing. Clean-catch midstream urine collection · Wash your hands before you start. · If the cup you are given has a lid, remove it carefully. Set it down with the inner surface up. Don't touch the inside of the cup with your fingers. · Clean the area around your genitals. ¨ For men: Pull back the foreskin, if present. Clean the head of your penis with medicated towelettes or swabs. ¨ For women: Spread open the genital folds of skin with one hand.  Then use medicated towelettes or swabs in your other hand to clean the area where urine comes out (the urethra). Wipe the area from front to back. · Start urinating into the toilet or urinal. A woman should hold apart the genital folds of skin while she urinates. · After the urine has flowed for several seconds, place the cup into the urine stream. Collect about 2 ounces of urine without stopping your flow of urine. · Don't touch the rim of the cup to your genital area. Don't get toilet paper, pubic hair, stool (feces), menstrual blood, or anything else in the urine sample. · Finish urinating into the toilet or urinal. 
· Carefully replace and tighten the lid on the cup, and then return it to the lab. If you are collecting the urine at home and can't get it to the lab in an hour, refrigerate it. Double-voided urine sample collection This method collects the urine your body is making right now. · Urinate into the toilet or urinal. Don't collect any of this urine. · Drink a large glass of water, and wait about 30 to 40 minutes. · Then get a urine sample. Follow the instructions above for collecting a clean-catch urine sample. · Take the urine sample to the lab. If you are collecting the urine at home and can't get it to the lab in an hour, refrigerate it. Follow-up care is a key part of your treatment and safety. Be sure to make and go to all appointments, and call your doctor if you are having problems. It's also a good idea to keep a list of the medicines you take. Ask your doctor when you can expect to have your test results. Where can you learn more? Go to http://harjit-jennifer.info/. Enter R266 in the search box to learn more about \"Urine Test: About This Test.\" Current as of: October 9, 2017 Content Version: 11.7 © 0259-4488 [employer]. Care instructions adapted under license by [employer] (which disclaims liability or warranty for this information).  If you have questions about a medical condition or this instruction, always ask your healthcare professional. [place] [address] any warranty or liability for your use of this information. Introducing [facility]! Alvarez Villalpando introduces [employer] patient portal. Now you can access parts of your medical record, email your doctor's office, and request medication refills online. 1. In your internet browser, go to [email or web address] 2. Click on the First Time User? Click Here link in the Sign In box. You will see the New Member Sign Up page. 3. Enter your [employer] Access Code exactly as it appears below. You will not need to use this code after youve completed the sign-up process. If you do not sign up before the expiration date, you must request a new code. · [employer] Access Code: [email or web address] Expires: 12/19/2018  3:16 PM 
 
4. Enter the last four digits of your Social Security Number (xxxx) and Date of Birth (mm/dd/yyyy) as indicated and click Submit. You will be taken to the next sign-up page. 5. Create a [employer] ID. This will be your [employer] login ID and cannot be changed, so think of one that is secure and easy to remember. 6. Create a [employer] password. You can change your password at any time. 7. Enter your Password Reset Question and Answer. This can be used at a later time if you forget your password. 8. Enter your e-mail address. You will receive e-mail notification when new information is available in [address] 9. Click Sign Up. You can now view and download portions of your medical record. 10. Click the Download Summary menu link to download a portable copy of your medical information. If you have questions, please visit the Frequently Asked Questions section of the [employer] website. Remember, [employer] is NOT to be used for urgent needs. For medical emergencies, dial 911. Now available from your iPhone and Android! Please provide this summary of care documentation to your next provider. Your primary care clinician is listed as NONE. If you have any questions after today's visit, please call [phone].

## 2018-09-20 NOTE — PROGRESS NOTES
Jose Manuel Colvin 76 y.o. female     Ms. Liz Bautista seen today for urinary retention 2 weeks status post anterior rectus sling cystourethropexy  Patient has experienced no stress urinary incontinence since Chandler catheter was removed several days ago but is also unable to initiate and maintain a urinary stream  Here today for PVR assessment of voiding efficiency       cc on 10 September 2018   cc on 2018      involuntary loss of bladder control when performing Valsalva inducing physical exertions worsening slowly over the past 3-5 years-now using one pad per day  Kegel exercise program has been ineffective in relieving HAYDEN      History of  tract disease, trauma, or surgery  No dysuria urgency or urgency incontinence  No neurologic symptoms      [facility]   Anterior-posterior repair of pelvic laxity  Dr. Mar Davila      Review of Systems:   CNS: No seizure syncope headaches dizziness or visual changes  Respiratory: No wheezing cough shortness of breath or chest pain  Cardiovascular: No angina no palpitations  Intestinal: No dyspepsia diarrhea or constipation  Urinary: No urgency frequency dysuria or hematuria  Skeletal: No bone or joint pain  Endocrine: No diabetes or thyroid disease  Other:                                                                                    3 para 2  ×2  (max birth weight 7.4)         Allergies: Allergies   Allergen Reactions    Nitrofurantoin Macrocrystalline Rash    Shellfish Containing Products Hives and Rash      Medications:    Current Outpatient Prescriptions   Medication Sig Dispense Refill    tamsulosin (FLOMAX) 0.4 mg capsule Take 1 Cap by mouth daily. 30 Cap 3    melatonin 10 mg tab Take  by mouth nightly as needed.  losartan (COZAAR) 100 mg tablet daily.  omeprazole (PRILOSEC) 20 mg capsule       therapeutic multivitamin (THERAGRAN) tablet Take 1 Tab by Mouth Once a Day.       ascorbic acid, vitamin C, (VITAMIN C) 250 mg tablet 500 mg.      omega 3-dha-epa-fish oil (FISH OIL) 60- mg cap 300 mg.      vitamin e (AQUA GEMS) 200 unit capsule 200 Units.  BIFIDOBACTERIUM INFANTIS PO Take  by Mouth Once a Day.  PSYLLIUM SEED, WITH DEXTROSE, PO Take  by Mouth Once a Day. Indications: Pt taking 2 capsule po every day      MILK THISTLE PO 1,000 mg.      oxyCODONE IR (ROXICODONE) 5 mg immediate release tablet Take 1 Tab by mouth every four (4) hours as needed for Pain. Max Daily Amount: 30 mg. 12 Tab 0    oxyCODONE IR (ROXICODONE) 5 mg immediate release tablet Take 1 Tab by mouth every six (6) hours as needed for Pain. Max Daily Amount: 20 mg. 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      Social History Main Topics    Smoking status: Never Smoker    Smokeless tobacco: Never Used    Alcohol use Yes      Comment: daliy glass of wine    Drug use: No    Sexual activity: Yes     Other Topics Concern    Not on file     Social History Narrative      Family History   Problem Relation Age of Onset    Cancer Mother     Colon Cancer Mother     Heart Disease Father     Hypertension Father     Cancer Maternal Aunt     Diabetes Paternal Grandmother         Physical Examination: Well-nourished mature female in no apparent distress    Pfannenstiel incision is clean dry and well-healed      Urinalysis: Negative dipstick/nitrite negative       cc today    Procedure Note:                                                        Chandler Catheter Placement    After prepping the introitus with Betadine solution and instilling 2% lidocaine jelly intraurethrally a 16 [place]ian Silastic Chandler catheter was passed into the bladder through the urethra with 10 cc of sterile water  inflating balloon 1000 cc-anai clear urine drained from the bladder-procedure was accompanied well-tolerated  EBL-none  Specimen-none    Impression: Urinary retention 2 weeks status post rectus sling cystourethropexy        Plan: Chandler catheter to leg bag gravity drainage            Flomax 0.4 mg daily for 30 refill ×2    RTC 5 days for a voiding trial/urethral manipulation-remove Chandler catheter at 0800 cc in office at 1500 for PVR      More than 1/2 of this 15  minute visit was spent in counselling and coordination of care, as described above. Kelly Guerrero MD  -electronically signed-    PLEASE NOTE:  This document has been produced using voice recognition software. Unrecognized errors in transcription may be present.

## 2018-09-20 NOTE — PROGRESS NOTES
Ms. Rod Craig has a reminder for a \"due or due soon\" health maintenance. I have asked that she contact her primary care provider for follow-up on this health maintenance. RBV per Dr. Cally Lugo inserted 16 Azerbaijani bartlett catheter. Inflated balloon with 10 cc water without complications. Drained 900 ml of clear urine. Attached catheter to leg bag. Patient tolerated well.

## 2018-09-20 NOTE — PATIENT INSTRUCTIONS
Urine Test: About This Test  What is it? A urine test checks the color, clarity (clear or cloudy), odor, concentration, and acidity (pH) of your urine. It also checks your levels of protein, sugar, blood cells, or other substances in your urine. This test is sometimes called a urinalysis. Why is this test done? A urine test may be done:  · To check for a disease or infection of the urinary tract. The urinary tract includes the kidneys, the tubes that carry urine from the kidneys to the bladder (ureters), and the bladder. It also includes the tube that carries urine from the bladder to outside the body (urethra). · To check the treatment of conditions such as diabetes, kidney stones, a urinary tract infection (UTI), high blood pressure, or some kidney or liver diseases. How can you prepare for the test?  · Before the test, don't eat foods that can change the color of your urine. Examples of these include blackberries, beets, and rhubarb. · Don't do heavy exercise before the test.  · Tell your doctor if you are menstruating or close to starting your period. Your doctor may want to wait to do the test.  · Tell your doctor about all the nonprescription and prescription medicines and herbs or other supplements you take. Some of these can affect the results of this test.  What happens during the test?  A urine test can be done in your doctor's office, clinic, or lab. Or you may be asked to collect a urine sample at home. Then you can take it to the office or lab for testing. Clean-catch midstream urine collection  · Wash your hands before you start. · If the cup you are given has a lid, remove it carefully. Set it down with the inner surface up. Don't touch the inside of the cup with your fingers. · Clean the area around your genitals. ¨ For men: Pull back the foreskin, if present. Clean the head of your penis with medicated towelettes or swabs.   ¨ For women: Spread open the genital folds of skin with one hand. Then use medicated towelettes or swabs in your other hand to clean the area where urine comes out (the urethra). Wipe the area from front to back. · Start urinating into the toilet or urinal. A woman should hold apart the genital folds of skin while she urinates. · After the urine has flowed for several seconds, place the cup into the urine stream. Collect about 2 ounces of urine without stopping your flow of urine. · Don't touch the rim of the cup to your genital area. Don't get toilet paper, pubic hair, stool (feces), menstrual blood, or anything else in the urine sample. · Finish urinating into the toilet or urinal.  · Carefully replace and tighten the lid on the cup, and then return it to the lab. If you are collecting the urine at home and can't get it to the lab in an hour, refrigerate it. Double-voided urine sample collection  This method collects the urine your body is making right now. · Urinate into the toilet or urinal. Don't collect any of this urine. · Drink a large glass of water, and wait about 30 to 40 minutes. · Then get a urine sample. Follow the instructions above for collecting a clean-catch urine sample. · Take the urine sample to the lab. If you are collecting the urine at home and can't get it to the lab in an hour, refrigerate it. Follow-up care is a key part of your treatment and safety. Be sure to make and go to all appointments, and call your doctor if you are having problems. It's also a good idea to keep a list of the medicines you take. Ask your doctor when you can expect to have your test results. Where can you learn more? Go to http://harjit-jennifer.info/. Enter R266 in the search box to learn more about \"Urine Test: About This Test.\"  Current as of: October 9, 2017  Content Version: 11.7  © 6799-4224 [employer].  Care instructions adapted under license by [employer] (which disclaims liability or warranty for this information). If you have questions about a medical condition or this instruction, always ask your healthcare professional. [place] [address] any warranty or liability for your use of this information.

## 2018-09-25 ENCOUNTER — OFFICE VISIT (OUTPATIENT)
Dept: UROLOGY | Age: 68
End: 2018-09-25

## 2018-09-25 VITALS
HEIGHT: 62 IN | SYSTOLIC BLOOD PRESSURE: 138 MMHG | BODY MASS INDEX: 29.44 KG/M2 | HEART RATE: 110 BPM | WEIGHT: 160 LBS | DIASTOLIC BLOOD PRESSURE: 79 MMHG | OXYGEN SATURATION: 93 %

## 2018-09-25 DIAGNOSIS — R33.9 RETENTION OF URINE: Primary | ICD-10-CM

## 2018-09-25 DIAGNOSIS — N39.3 SUI (STRESS URINARY INCONTINENCE, FEMALE): ICD-10-CM

## 2018-09-25 LAB
BILIRUB UR QL STRIP: NEGATIVE
GLUCOSE UR-MCNC: NEGATIVE MG/DL
KETONES P FAST UR STRIP-MCNC: NEGATIVE MG/DL
PH UR STRIP: 6 [PH] (ref 4.6–8)
PROT UR QL STRIP: NEGATIVE
SP GR UR STRIP: 1.01 (ref 1–1.03)
UA UROBILINOGEN AMB POC: NORMAL (ref 0.2–1)
URINALYSIS CLARITY POC: CLEAR
URINALYSIS COLOR POC: YELLOW
URINE BLOOD POC: NEGATIVE
URINE LEUKOCYTES POC: NORMAL
URINE NITRITES POC: NEGATIVE

## 2018-09-25 NOTE — MR AVS SNAPSHOT
[address] [address] 
[phone] Patient: Brooklynn Black MRN: [id number] ZGO:[birth year] Visit Information Date & Time Provider Department Dept. Phone Encounter #  
 9/25/2018  2:45 PM Kenn Barrios, [phone] Upcoming Health Maintenance Date Due Hepatitis C Screening 1950 DTaP/Tdap/Td series (1 - Tdap) 5/26/1971 Shingrix Vaccine Age 50> (1 of 2) 5/26/2000 FOBT Q 1 YEAR AGE 50-75 5/26/2000 BREAST CANCER SCRN MAMMOGRAM 2/18/2015 GLAUCOMA SCREENING Q2Y 5/26/2015 Bone Densitometry (Dexa) Screening 5/26/2015 Pneumococcal 65+ Low/Medium Risk (1 of 2 - PCV13) 5/26/2015 MEDICARE YEARLY EXAM 6/21/2018 Influenza Age 5 to Adult 8/1/2018 Allergies as of 9/25/2018  Review Complete On: 9/20/2018 By: Kenn Barrios MD  
  
 Severity Noted Reaction Type Reactions Nitrofurantoin Macrocrystalline Medium 10/09/2012    Rash Shellfish Containing Products Medium 11/05/2012    Hives, Rash Current Immunizations  Never Reviewed No immunizations on file. Not reviewed this visit You Were Diagnosed With   
  
 Codes Comments Retention of urine    -  Primary ICD-10-CM: R33.9 ICD-9-CM: 788.20 Vitals BP Pulse Height(growth percentile) Weight(growth percentile) SpO2 BMI  
 138/79 (BP 1 Location: Left arm, BP Patient Position: Sitting) (!) 110 5' 2\" (1.575 m) 160 lb (72.6 kg) 93% 29.26 kg/m2 OB Status Smoking Status Hysterectomy Never Smoker Vitals History BMI and BSA Data Body Mass Index Body Surface Area  
 29.26 kg/m 2 1.78 m 2 Preferred Pharmacy Pharmacy Name Phone [facility] [phone] [facility] [phone] Your Updated Medication List  
  
   
This list is accurate as of 9/25/18  3:36 PM.  Always use your most recent med list.  
  
  
  
  
 ascorbic acid (vitamin C) 250 mg tablet Commonly known as:  VITAMIN C  
500 mg. BIFIDOBACTERIUM INFANTIS PO Take  by Mouth Once a Day. FISH OIL 60- mg Cap Generic drug:  omega 3-dha-epa-fish oil 300 mg.  
  
 losartan 100 mg tablet Commonly known as:  COZAAR  
daily. melatonin 10 mg Tab Take  by mouth nightly as needed. MILK THISTLE PO  
1,000 mg.  
  
 omeprazole 20 mg capsule Commonly known as:  PRILOSEC  
  
 * oxyCODONE IR 5 mg immediate release tablet Commonly known as:  Cheryln Records Take 1 Tab by mouth every six (6) hours as needed for Pain. Max Daily Amount: 20 mg.  
  
 * oxyCODONE IR 5 mg immediate release tablet Commonly known as:  Cheryln Records Take 1 Tab by mouth every four (4) hours as needed for Pain. Max Daily Amount: 30 mg. PSYLLIUM SEED (WITH DEXTROSE) PO Take  by Mouth Once a Day. Indications: Pt taking 2 capsule po every day  
  
 tamsulosin 0.4 mg capsule Commonly known as:  FLOMAX Take 1 Cap by mouth daily. therapeutic multivitamin tablet Commonly known as:  [facility] Take 1 Tab by Mouth Once a Day. vitamin e 200 unit capsule Commonly known as:  AQUA GEMS  
200 Units. * Notice: This list has 2 medication(s) that are the same as other medications prescribed for you. Read the directions carefully, and ask your doctor or other care provider to review them with you. We Performed the Following AMB POC URINALYSIS DIP STICK AUTO W/O MICRO [58564 CPT(R)] Introducing [facility] SERVICES! Donovan Colvin introduces [employer] patient portal. Now you can access parts of your medical record, email your doctor's office, and request medication refills online. 1. In your internet browser, go to [email or web address] 2. Click on the First Time User? Click Here link in the Sign In box. You will see the New Member Sign Up page. 3. Enter your DotBlu Access Code exactly as it appears below. You will not need to use this code after youve completed the sign-up process. If you do not sign up before the expiration date, you must request a new code. · DotBlu Access Code: [email or web address] Expires: 12/19/2018  3:16 PM 
 
4. Enter the last four digits of your Social Security Number (xxxx) and Date of Birth (mm/dd/yyyy) as indicated and click Submit. You will be taken to the next sign-up page. 5. Create a DotBlu ID. This will be your DotBlu login ID and cannot be changed, so think of one that is secure and easy to remember. 6. Create a DotBlu password. You can change your password at any time. 7. Enter your Password Reset Question and Answer. This can be used at a later time if you forget your password. 8. Enter your e-mail address. You will receive e-mail notification when new information is available in [address]. 9. Click Sign Up. You can now view and download portions of your medical record. 10. Click the Download Summary menu link to download a portable copy of your medical information. If you have questions, please visit the Frequently Asked Questions section of the DotBlu website. Remember, DotBlu is NOT to be used for urgent needs. For medical emergencies, dial 911. Now available from your iPhone and Android! Please provide this summary of care documentation to your next provider. Your primary care clinician is listed as NONE. If you have any questions after today's visit, please call [phone].

## 2018-09-25 NOTE — PROGRESS NOTES
[facility]  OFFICE PROCEDURE PROGRESS NOTE        Chart reviewed for the following:   Jahaira VASQUEZ LPN, have reviewed the History, Physical and updated the Allergic reactions for [address] performed immediately prior to start of procedure:   Jahaira VASQUEZ LPN, have performed the following reviews on Albertine Few prior to the start of the procedure:            * Patient was identified by name and date of birth   * Agreement on procedure being performed was verified  * Risks and Benefits explained to the patient  * Procedure site verified and marked as necessary  * Patient was positioned for comfort  * Consent was signed and verified     Time: 15:40      Date of procedure: 9/25/2018    Procedure performed by:  Lorin Sanford MD    Provider assisted by: Santa Garzon LPN    Patient assisted by: self    How tolerated by patient: tolerated the procedure well with no complications    Post Procedural Pain Scale: 0 - No Hurt    Comments: Patient verbalized understanding of procedure and post procedure instructions.

## 2018-09-26 NOTE — PROGRESS NOTES
Reese Ramirez 76 y.o. female     Ms. Jenn Swanson seen today for postop evaluation anterior rectus sling cystourethropexy on 2018 with persistent postop retention requiring Chandler catheter management-    Indwelling Chandler catheter was removed 6 hours ago patient has voided twice spontaneously small amounts  Patient has experienced no episodes of HAYDEN since removing Chandler catheter    involuntary loss of bladder control when performing Valsalva inducing physical exertions worsening slowly over the past 3-5 years-now using one pad per day  Kegel exercise program has been ineffective in relieving HAYDEN      No history of  tract disease, trauma, or surgery  No dysuria urgency or urgency incontinence  No neurologic symptoms      [facility] 1998  Anterior-posterior repair of pelvic laxity  Dr. Yessica Valentine     cc on 2018      Review of Systems:   CNS: No seizure syncope headaches dizziness or visual changes  Respiratory: No wheezing cough shortness of breath or chest pain  Cardiovascular: No angina no palpitations  Intestinal: No dyspepsia diarrhea or constipation  Urinary: No urgency frequency dysuria or hematuria  Skeletal: No bone or joint pain  Endocrine: No diabetes or thyroid disease  Other:                                                                                    3 para 2  ×2  (max birth weight 7.4)              Allergies: Allergies   Allergen Reactions    Nitrofurantoin Macrocrystalline Rash    Shellfish Containing Products Hives and Rash      Medications:    Current Outpatient Prescriptions   Medication Sig Dispense Refill    tamsulosin (FLOMAX) 0.4 mg capsule Take 1 Cap by mouth daily. 30 Cap 3    melatonin 10 mg tab Take  by mouth nightly as needed.  losartan (COZAAR) 100 mg tablet daily.  omeprazole (PRILOSEC) 20 mg capsule       therapeutic multivitamin (THERAGRAN) tablet Take 1 Tab by Mouth Once a Day.       ascorbic acid, vitamin C, (VITAMIN C) 250 mg tablet 500 mg.      omega 3-dha-epa-fish oil (FISH OIL) 60- mg cap 300 mg.      vitamin e (AQUA GEMS) 200 unit capsule 200 Units.  BIFIDOBACTERIUM INFANTIS PO Take  by Mouth Once a Day.  PSYLLIUM SEED, WITH DEXTROSE, PO Take  by Mouth Once a Day. Indications: Pt taking 2 capsule po every day      MILK THISTLE PO 1,000 mg.      oxyCODONE IR (ROXICODONE) 5 mg immediate release tablet Take 1 Tab by mouth every four (4) hours as needed for Pain. Max Daily Amount: 30 mg. 12 Tab 0    oxyCODONE IR (ROXICODONE) 5 mg immediate release tablet Take 1 Tab by mouth every six (6) hours as needed for Pain. Max Daily Amount: 20 mg. 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      Social History Main Topics    Smoking status: Never Smoker    Smokeless tobacco: Never Used    Alcohol use Yes      Comment: daliy glass of wine    Drug use: No    Sexual activity: Yes     Other Topics Concern    Not on file     Social History Narrative      Family History   Problem Relation Age of Onset    Cancer Mother     Colon Cancer Mother     Heart Disease Father     Hypertension Father     Cancer Maternal Aunt     Diabetes Paternal Grandmother         Physical Examination: Well-nourished mature female in no apparent distress    Suprapubic region Pfannenstiel incision is clean dry and well-healed no erythema tenderness or induration     urinalysis: Negative nitrite/negative heme    PVR today 311 cc    Procedure Note:                                                 [facility] [employer] [facility] [employer]    After prepping the introitus with Betadine solution and instilling 2% lidocaine jelly intraurethrally Azzie Ding sounds were passed in serial fashion beginning with 25 [place] ending with 27 [place] encountering no resistance to passage of the larger sounds-procedure was uncomplicated and well-tolerated  EBL-none  Specimen-none    Patient was instructed today in the technique of clean intermittent self-catheterization utilizing a 16 French red rubber catheter/  patient demonstrated proficiency in the technique of CIC    Impression: Stress urinary incontinence status post anterior rectus fascial sling cystourethropexy                        Urinary retention postop cystourethropexy-    Plan: Flomax 0.4 mg daily            CIC as needed for recurrent retention    rtc 1 week PVR      More than 1/2 of this 15 minute visit was spent in counselling and coordination of care, as described above. Pamela Smith MD  -electronically signed-    PLEASE NOTE:  This document has been produced using voice recognition software. Unrecognized errors in transcription may be present.

## 2018-10-02 ENCOUNTER — OFFICE VISIT (OUTPATIENT)
Dept: UROLOGY | Age: 68
End: 2018-10-02

## 2018-10-02 VITALS
BODY MASS INDEX: 29.44 KG/M2 | WEIGHT: 160 LBS | HEART RATE: 88 BPM | DIASTOLIC BLOOD PRESSURE: 82 MMHG | OXYGEN SATURATION: 97 % | HEIGHT: 62 IN | SYSTOLIC BLOOD PRESSURE: 126 MMHG

## 2018-10-02 DIAGNOSIS — R33.9 URINARY RETENTION: Primary | ICD-10-CM

## 2018-10-02 LAB
BILIRUB UR QL STRIP: NEGATIVE
GLUCOSE UR-MCNC: NEGATIVE MG/DL
KETONES P FAST UR STRIP-MCNC: NEGATIVE MG/DL
PH UR STRIP: 6.5 [PH] (ref 4.6–8)
PROT UR QL STRIP: NEGATIVE
SP GR UR STRIP: 1.01 (ref 1–1.03)
UA UROBILINOGEN AMB POC: NORMAL (ref 0.2–1)
URINALYSIS CLARITY POC: CLEAR
URINALYSIS COLOR POC: YELLOW
URINE BLOOD POC: NORMAL
URINE LEUKOCYTES POC: NORMAL
URINE NITRITES POC: NEGATIVE

## 2018-10-02 NOTE — PATIENT INSTRUCTIONS
Constipation: Care Instructions  Your Care Instructions    Constipation means that you have a hard time passing stools (bowel movements). People pass stools from 3 times a day to once every 3 days. What is normal for you may be different. Constipation may occur with pain in the rectum and cramping. The pain may get worse when you try to pass stools. Sometimes there are small amounts of bright red blood on toilet paper or the surface of stools. This is because of enlarged veins near the rectum (hemorrhoids). A few changes in your diet and lifestyle may help you avoid ongoing constipation. Your doctor may also prescribe medicine to help loosen your stool. Some medicines can cause constipation. These include pain medicines and antidepressants. Tell your doctor about all the medicines you take. Your doctor may want to make a medicine change to ease your symptoms. Follow-up care is a key part of your treatment and safety. Be sure to make and go to all appointments, and call your doctor if you are having problems. It's also a good idea to know your test results and keep a list of the medicines you take. How can you care for yourself at home? · Drink plenty of fluids, enough so that your urine is light yellow or clear like water. If you have kidney, heart, or liver disease and have to limit fluids, talk with your doctor before you increase the amount of fluids you drink. · Include high-fiber foods in your diet each day. These include fruits, vegetables, beans, and whole grains. · Get at least 30 minutes of exercise on most days of the week. Walking is a good choice. You also may want to do other activities, such as running, swimming, cycling, or playing tennis or team sports. · Take a fiber supplement, such as Citrucel or Metamucil, every day. Read and follow all instructions on the label. · Schedule time each day for a bowel movement. A daily routine may help.  Take your time having your bowel movement. · Support your feet with a small step stool when you sit on the toilet. This helps flex your hips and places your pelvis in a squatting position. · Your doctor may recommend an over-the-counter laxative to relieve your constipation. Examples are Milk of Magnesia and MiraLax. Read and follow all instructions on the label. Do not use laxatives on a long-term basis. When should you call for help? Call your doctor now or seek immediate medical care if:    · You have new or worse belly pain.     · You have new or worse nausea or vomiting.     · You have blood in your stools.    Watch closely for changes in your health, and be sure to contact your doctor if:    · Your constipation is getting worse.     · You do not get better as expected. Where can you learn more? Go to http://harjit-jennifer.info/. Enter 21 [phone] in the search box to learn more about \"Constipation: Care Instructions. \"  Current as of: November 20, 2017  Content Version: 11.7  © 8826-3451 [employer]. Care instructions adapted under license by [employer] (which disclaims liability or warranty for this information). If you have questions about a medical condition or this instruction, always ask your healthcare professional. [place] [address] any warranty or liability for your use of this information. Urinary Retention: Care Instructions  Your Care Instructions    Urinary retention means that you aren't able to urinate. In men, it is often caused by a blockage of the urinary tract from an enlarged prostate gland. In men and women, it can also be caused by an infection or nerve damage. Or it may be a side effect of a medicine. The doctor may have drained the urine from your bladder. If you still have problems passing urine, you may need to use a catheter at home. This is used to empty your bladder until the problem can be fixed.  Your doctor may put a catheter in your bladder before you go home. If so, he or she will tell you when to come back to have the catheter removed. The doctor has checked you closely. But problems can develop later. If you notice any problems or new symptoms, get medical treatment right away. Follow-up care is a key part of your treatment and safety. Be sure to make and go to all appointments, and call your doctor if you are having problems. It's also a good idea to know your test results and keep a list of the medicines you take. How can you care for yourself at home? · Take your medicines exactly as prescribed. Call your doctor if you think you are having a problem with your medicine. You will get more details on the specific medicines your doctor prescribes. · Check with your doctor before you use any over-the-counter medicines. Many cold and allergy medicines, for example, can make this problem worse. Make sure your doctor knows all of the medicines, vitamins, supplements, and herbal remedies you take. · Spread out through the day the amount of fluid you drink. Do not drink a lot at bedtime. · Avoid alcohol and caffeine. · If you have been given a catheter, or if one is already in place, follow the instructions you were given. Always wash your hands before and after you handle the catheter. When should you call for help? Call your doctor now or seek immediate medical care if:    · You cannot urinate at all, or it is getting harder to urinate.     · You have symptoms of a urinary tract infection. These may include:  ¨ Pain or burning when you urinate. ¨ A frequent need to urinate without being able to pass much urine. ¨ Pain in the flank, which is just below the rib cage and above the waist on either side of the back. ¨ Blood in your urine. ¨ A fever.    Watch closely for changes in your health, and be sure to contact your doctor if:    · You have any problems with your catheter.     · You do not get better as expected. Where can you learn more?   Go to http://harjit-jennifer.info/. Enter M244 in the search box to learn more about \"Urinary Retention: Care Instructions. \"  Current as of: May 12, 2017  Content Version: 11.7  © 8314-0480 [employer], [employer]. Care instructions adapted under license by [employer] (which disclaims liability or warranty for this information). If you have questions about a medical condition or this instruction, always ask your healthcare professional. [place] [address] any warranty or liability for your use of this information.

## 2018-10-02 NOTE — PROGRESS NOTES
Ms. Ramon Rush has a reminder for a \"due or due soon\" health maintenance. I have asked that she contact her primary care provider for follow-up on this health maintenance.

## 2018-10-02 NOTE — PROGRESS NOTES
Luann Sesay 76 y.o. female     Ms. Rima Aguilar seen today for follow-up stress urinary incontinence status post anterior rectus fascial sling cystourethropexy on 2018-postop urinary retention managed with indwelling Chandler catheter for 1 week now performing CIC daily-patient has had several episodes of spontaneous voiding with a forceful stream but still relies on CIC for complete bladder emptying  [facility] no anatomic obstruction of the urethra    involuntary loss of bladder control when performing Valsalva inducing physical exertions worsening slowly over the past 3-5 years-now using one pad per day  Kegel exercise program has been ineffective in relieving HAYDEN      No history of  tract disease, trauma, or surgery  No dysuria urgency or urgency incontinence  No neurologic symptoms      [facility]   Anterior-posterior repair of pelvic laxity  Dr. Aparna Mcfadden      cc on 2018   cc  2018    eview of Systems:   CNS: No seizure syncope headaches dizziness or visual changes  Respiratory: No wheezing cough shortness of breath or chest pain  Cardiovascular: No angina no palpitations  Intestinal: No dyspepsia diarrhea or constipation  Urinary: No urgency frequency dysuria or hematuria  Skeletal: No bone or joint pain  Endocrine: No diabetes or thyroid disease  Other:                                                                                    3 para 2  ×2  (max birth weight 7.4)          Allergies: Allergies   Allergen Reactions    Nitrofurantoin Macrocrystalline Rash    Shellfish Containing Products Hives and Rash      Medications:    Current Outpatient Prescriptions   Medication Sig Dispense Refill    tamsulosin (FLOMAX) 0.4 mg capsule Take 1 Cap by mouth daily. 30 Cap 3    melatonin 10 mg tab Take  by mouth nightly as needed.  losartan (COZAAR) 100 mg tablet daily.       omeprazole (PRILOSEC) 20 mg capsule  therapeutic multivitamin (THERAGRAN) tablet Take 1 Tab by Mouth Once a Day.  ascorbic acid, vitamin C, (VITAMIN C) 250 mg tablet 500 mg.      omega 3-dha-epa-fish oil (FISH OIL) 60- mg cap 300 mg.      vitamin e (AQUA GEMS) 200 unit capsule 200 Units.  BIFIDOBACTERIUM INFANTIS PO Take  by Mouth Once a Day.  PSYLLIUM SEED, WITH DEXTROSE, PO Take  by Mouth Once a Day. Indications: Pt taking 2 capsule po every day      MILK THISTLE PO 1,000 mg.      oxyCODONE IR (ROXICODONE) 5 mg immediate release tablet Take 1 Tab by mouth every four (4) hours as needed for Pain. Max Daily Amount: 30 mg. 12 Tab 0    oxyCODONE IR (ROXICODONE) 5 mg immediate release tablet Take 1 Tab by mouth every six (6) hours as needed for Pain. Max Daily Amount: 20 mg. 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      Social History Main Topics    Smoking status: Never Smoker    Smokeless tobacco: Never Used    Alcohol use Yes      Comment: daliy glass of wine    Drug use: No    Sexual activity: Yes     Other Topics Concern    Not on file     Social History Narrative      Family History   Problem Relation Age of Onset    Cancer Mother     Colon Cancer Mother     Heart Disease Father     Hypertension Father     Cancer Maternal Aunt     Diabetes Paternal Grandmother         Physical Examination: Well-nourished mature female in no apparent distress      Urinalysis: Negative nitrite/negative heme    PVR today 276 cc    Impression: Stress urinary incontinence 3 weeks status post rectus fascial sling cystourethropexy with urinary retention        Plan: CIC at least once daily            Flomax 0.4 mg daily    RTC 4 weeks    Discussed prospect of urethral lysis if retention persists      More than 1/2 of this 15  minute visit was spent in counselling and coordination of care, as described above. Carmine Yoder MD  -electronically signed-    PLEASE NOTE:  This document has been produced using voice recognition software. Unrecognized errors in transcription may be present.

## 2018-10-02 NOTE — MR AVS SNAPSHOT
[address] [facility] Bert[place] A [address] [address] 
[phone] Patient: Raymond Gaxiola MRN: [id number] JI Visit Information Date & Time Provider Department Dept. Phone Encounter #  
 10/2/2018 11:30 AM [facility] [phone] [id number] Your Appointments 10/31/2018  9:45 AM  
Office Visit with Broderick Reyes MD  
[facility] Appt Note: follow up [address] Bert[place] A [address] [address]  
[phone] [address] [place] [address]  Upcoming Health Maintenance Date Due Hepatitis C Screening 1950 DTaP/Tdap/Td series (1 - Tdap) 1971 Shingrix Vaccine Age 50> (1 of 2) 2000 FOBT Q 1 YEAR AGE 50-75 2000 BREAST CANCER SCRN MAMMOGRAM 2015 GLAUCOMA SCREENING Q2Y 2015 Bone Densitometry (Dexa) Screening 2015 Pneumococcal 65+ Low/Medium Risk (1 of 2 - PCV13) 2015 MEDICARE YEARLY EXAM 2018 Influenza Age 5 to Adult 2018 Allergies as of 10/2/2018  Review Complete On: 10/2/2018 By: Zoey Jenkins LPN Severity Noted Reaction Type Reactions Nitrofurantoin Macrocrystalline Medium 10/09/2012    Rash Shellfish Containing Products Medium 2012    Hives, Rash Current Immunizations  Never Reviewed No immunizations on file. Not reviewed this visit You Were Diagnosed With   
  
 Codes Comments Urinary retention    -  Primary ICD-10-CM: R33.9 ICD-9-CM: 788.20 Vitals BP Pulse Height(growth percentile) Weight(growth percentile) SpO2 BMI  
 126/82 (BP 1 Location: Left arm, BP Patient Position: Sitting) 88 5' 2\" (1.575 m) 160 lb (72.6 kg) 97% 29.26 kg/m2 OB Status Smoking Status Hysterectomy Never Smoker Vitals History BMI and BSA Data Body Mass Index Body Surface Area  
 29.26 kg/m 2 1.78 m 2 Preferred Pharmacy Pharmacy Name Phone [phone] [facility] [phone] Your Updated Medication List  
  
   
This list is accurate as of 10/2/18 12:11 PM.  Always use your most recent med list.  
  
  
  
  
 ascorbic acid (vitamin C) 250 mg tablet Commonly known as:  VITAMIN C  
500 mg. BIFIDOBACTERIUM INFANTIS PO Take  by Mouth Once a Day. FISH OIL 60- mg Cap Generic drug:  omega 3-dha-epa-fish oil 300 mg.  
  
 losartan 100 mg tablet Commonly known as:  COZAAR  
daily. melatonin 10 mg Tab Take  by mouth nightly as needed. MILK THISTLE PO  
1,000 mg.  
  
 omeprazole 20 mg capsule Commonly known as:  PRILOSEC  
  
 * oxyCODONE IR 5 mg immediate release tablet Commonly known as:  Blanchie Roots Take 1 Tab by mouth every six (6) hours as needed for Pain. Max Daily Amount: 20 mg.  
  
 * oxyCODONE IR 5 mg immediate release tablet Commonly known as:  Blanchie Roots Take 1 Tab by mouth every four (4) hours as needed for Pain. Max Daily Amount: 30 mg. PSYLLIUM SEED (WITH DEXTROSE) PO Take  by Mouth Once a Day. Indications: Pt taking 2 capsule po every day  
  
 tamsulosin 0.4 mg capsule Commonly known as:  FLOMAX Take 1 Cap by mouth daily. therapeutic multivitamin tablet Commonly known as:  [facility] Take 1 Tab by Mouth Once a Day. vitamin e 200 unit capsule Commonly known as:  AQUA GEMS  
200 Units. * Notice: This list has 2 medication(s) that are the same as other medications prescribed for you. Read the directions carefully, and ask your doctor or other care provider to review them with you. We Performed the Following AMB POC URINALYSIS DIP STICK AUTO W/O MICRO [24748 CPT(R)] Patient Instructions Constipation: Care Instructions Your Care Instructions Constipation means that you have a hard time passing stools (bowel movements). People pass stools from 3 times a day to once every 3 days. What is normal for you may be different. Constipation may occur with pain in the rectum and cramping. The pain may get worse when you try to pass stools. Sometimes there are small amounts of bright red blood on toilet paper or the surface of stools. This is because of enlarged veins near the rectum (hemorrhoids). A few changes in your diet and lifestyle may help you avoid ongoing constipation. Your doctor may also prescribe medicine to help loosen your stool. Some medicines can cause constipation. These include pain medicines and antidepressants. Tell your doctor about all the medicines you take. Your doctor may want to make a medicine change to ease your symptoms. Follow-up care is a key part of your treatment and safety. Be sure to make and go to all appointments, and call your doctor if you are having problems. It's also a good idea to know your test results and keep a list of the medicines you take. How can you care for yourself at home? · Drink plenty of fluids, enough so that your urine is light yellow or clear like water. If you have kidney, heart, or liver disease and have to limit fluids, talk with your doctor before you increase the amount of fluids you drink. · Include high-fiber foods in your diet each day. These include fruits, vegetables, beans, and whole grains. · Get at least 30 minutes of exercise on most days of the week. Walking is a good choice. You also may want to do other activities, such as running, swimming, cycling, or playing tennis or team sports. · Take a fiber supplement, such as Citrucel or Metamucil, every day. Read and follow all instructions on the label. · Schedule time each day for a bowel movement. A daily routine may help. Take your time having your bowel movement. · Support your feet with a small step stool when you sit on the toilet. This helps flex your hips and places your pelvis in a squatting position. · Your doctor may recommend an over-the-counter laxative to relieve your constipation. Examples are Milk of Magnesia and MiraLax. Read and follow all instructions on the label. Do not use laxatives on a long-term basis. When should you call for help? Call your doctor now or seek immediate medical care if: 
  · You have new or worse belly pain.  
  · You have new or worse nausea or vomiting.  
  · You have blood in your stools.  
 Watch closely for changes in your health, and be sure to contact your doctor if: 
  · Your constipation is getting worse.  
  · You do not get better as expected. Where can you learn more? Go to http://harjit-jennifer.info/. Enter 21 [phone] in the search box to learn more about \"Constipation: Care Instructions. \" Current as of: November 20, 2017 Content Version: 11.7 © 9388-7671 [employer]. Care instructions adapted under license by [employer] (which disclaims liability or warranty for this information). If you have questions about a medical condition or this instruction, always ask your healthcare professional. [place] [address] any warranty or liability for your use of this information. Urinary Retention: Care Instructions Your Care Instructions Urinary retention means that you aren't able to urinate. In men, it is often caused by a blockage of the urinary tract from an enlarged prostate gland. In men and women, it can also be caused by an infection or nerve damage. Or it may be a side effect of a medicine. The doctor may have drained the urine from your bladder. If you still have problems passing urine, you may need to use a catheter at home. This is used to empty your bladder until the problem can be fixed.  Your doctor may put a catheter in your bladder before you go home. If so, he or she will tell you when to come back to have the catheter removed. The doctor has checked you closely. But problems can develop later. If you notice any problems or new symptoms, get medical treatment right away. Follow-up care is a key part of your treatment and safety. Be sure to make and go to all appointments, and call your doctor if you are having problems. It's also a good idea to know your test results and keep a list of the medicines you take. How can you care for yourself at home? · Take your medicines exactly as prescribed. Call your doctor if you think you are having a problem with your medicine. You will get more details on the specific medicines your doctor prescribes. · Check with your doctor before you use any over-the-counter medicines. Many cold and allergy medicines, for example, can make this problem worse. Make sure your doctor knows all of the medicines, vitamins, supplements, and herbal remedies you take. · Spread out through the day the amount of fluid you drink. Do not drink a lot at bedtime. · Avoid alcohol and caffeine. · If you have been given a catheter, or if one is already in place, follow the instructions you were given. Always wash your hands before and after you handle the catheter. When should you call for help? Call your doctor now or seek immediate medical care if: 
  · You cannot urinate at all, or it is getting harder to urinate.  
  · You have symptoms of a urinary tract infection. These may include: 
¨ Pain or burning when you urinate. ¨ A frequent need to urinate without being able to pass much urine. ¨ Pain in the flank, which is just below the rib cage and above the waist on either side of the back. ¨ Blood in your urine. ¨ A fever.  
 Watch closely for changes in your health, and be sure to contact your doctor if: 
  · You have any problems with your catheter.   · You do not get better as expected. Where can you learn more? Go to http://harjit-jennifer.info/. Enter M244 in the search box to learn more about \"Urinary Retention: Care Instructions. \" Current as of: May 12, 2017 Content Version: 11.7 © 7020-4713 AIM. Care instructions adapted under license by [employer] (which disclaims liability or warranty for this information). If you have questions about a medical condition or this instruction, always ask your healthcare professional. [address] any warranty or liability for your use of this information. Introducing [facility]! [facility] introduces Easy Voyage patient portal. Now you can access parts of your medical record, email your doctor's office, and request medication refills online. 1. In your internet browser, go to [email or web address] 2. Click on the First Time User? Click Here link in the Sign In box. You will see the New Member Sign Up page. 3. Enter your Easy Voyage Access Code exactly as it appears below. You will not need to use this code after youve completed the sign-up process. If you do not sign up before the expiration date, you must request a new code. · Easy Voyage Access Code: [email or web address] Expires: 12/19/2018  3:16 PM 
 
4. Enter the last four digits of your Social Security Number (xxxx) and Date of Birth (mm/dd/yyyy) as indicated and click Submit. You will be taken to the next sign-up page. 5. Create a Clear Blue [employer] ID. This will be your Easy Voyage login ID and cannot be changed, so think of one that is secure and easy to remember. 6. Create a Easy Voyage password. You can change your password at any time. 7. Enter your Password Reset Question and Answer. This can be used at a later time if you forget your password. 8. Enter your e-mail address. You will receive e-mail notification when new information is available in [address] 9. Click Sign Up. You can now view and download portions of your medical record. 10. Click the Download Summary menu link to download a portable copy of your medical information. If you have questions, please visit the Frequently Asked Questions section of the Man[employer] website. Remember, Managed Methods is NOT to be used for urgent needs. For medical emergencies, dial 911. Now available from your iPhone and Android! Please provide this summary of care documentation to your next provider. Your primary care clinician is listed as NONE. If you have any questions after today's visit, please call [phone].

## 2018-10-31 ENCOUNTER — OFFICE VISIT (OUTPATIENT)
Dept: UROLOGY | Age: 68
End: 2018-10-31

## 2018-10-31 VITALS
OXYGEN SATURATION: 95 % | BODY MASS INDEX: 29.44 KG/M2 | HEIGHT: 62 IN | HEART RATE: 85 BPM | SYSTOLIC BLOOD PRESSURE: 122 MMHG | WEIGHT: 160 LBS | DIASTOLIC BLOOD PRESSURE: 73 MMHG

## 2018-10-31 DIAGNOSIS — R33.9 RETENTION OF URINE: Primary | ICD-10-CM

## 2018-10-31 DIAGNOSIS — N39.3 STRESS INCONTINENCE: ICD-10-CM

## 2018-10-31 LAB
BILIRUB UR QL STRIP: NEGATIVE
GLUCOSE UR-MCNC: NEGATIVE MG/DL
KETONES P FAST UR STRIP-MCNC: NEGATIVE MG/DL
PH UR STRIP: 6.5 [PH] (ref 4.6–8)
PROT UR QL STRIP: NEGATIVE
SP GR UR STRIP: 1 (ref 1–1.03)
UA UROBILINOGEN AMB POC: NORMAL (ref 0.2–1)
URINALYSIS CLARITY POC: CLEAR
URINALYSIS COLOR POC: YELLOW
URINE BLOOD POC: NEGATIVE
URINE LEUKOCYTES POC: NEGATIVE
URINE NITRITES POC: NEGATIVE

## 2018-10-31 RX ORDER — PRAVASTATIN SODIUM 40 MG/1
40 TABLET ORAL
COMMUNITY
Start: 2018-10-26

## 2018-10-31 NOTE — PROGRESS NOTES
Diya Vee 76 y.o. female     Ms. Chai Valdes seen today for follow-up stress urinary incontinence status post anterior rectus fascial sling cystourethropexy 2018 with postop course complicated by persistent retention requiring indwelling Chandler catheter drainage and subsequent CIC-patient has performed at nighttime CIC now draining 1 or 2 ounces per catheterization  No complaints voiding except for mild urgency sensation when changing from sitting to standing position    [employer] sound passage on 2018 shows no anatomic obstruction of the urethra     involuntary loss of bladder control when performing Valsalva inducing physical exertions worsening slowly over the past 3-5 years-now using one pad per day  Kegel exercise program has been ineffective in relieving HAYDEN      No history of  tract disease, trauma, or surgery  No dysuria urgency or urgency incontinence  No neurologic symptoms      [facility]   Anterior-posterior repair of pelvic laxity  Dr. Compa Hester      cc on 2018   cc  2018   cc on 2018     eview of Systems:   CNS: No seizure syncope headaches dizziness or visual changes  Respiratory: No wheezing cough shortness of breath or chest pain  Cardiovascular: No angina no palpitations  Intestinal: No dyspepsia diarrhea or constipation  Urinary: No urgency frequency dysuria or hematuria  Skeletal: No bone or joint pain  Endocrine: No diabetes or thyroid disease  Other:                                                                                    3 para 2  ×2  (max birth weight 7.4)              Allergies:    Allergies   Allergen Reactions    Nitrofurantoin Macrocrystalline Rash and Itching    Shellfish Containing Products Hives and Rash      Medications:    Current Outpatient Medications   Medication Sig Dispense Refill    pravastatin (PRAVACHOL) 40 mg tablet 40 mg.      melatonin 10 mg tab Take  by mouth nightly as needed.  losartan (COZAAR) 100 mg tablet daily.  omeprazole (PRILOSEC) 20 mg capsule       therapeutic multivitamin (THERAGRAN) tablet Take 1 Tab by Mouth Once a Day.  ascorbic acid, vitamin C, (VITAMIN C) 250 mg tablet 500 mg.      omega 3-dha-epa-fish oil (FISH OIL) 60- mg cap 300 mg.      BIFIDOBACTERIUM INFANTIS PO Take  by Mouth Once a Day.  PSYLLIUM SEED, WITH DEXTROSE, PO Take  by Mouth Once a Day. Indications: Pt taking 2 capsule po every day      MILK THISTLE PO 1,000 mg.  tamsulosin (FLOMAX) 0.4 mg capsule Take 1 Cap by mouth daily. 30 Cap 3    oxyCODONE IR (ROXICODONE) 5 mg immediate release tablet Take 1 Tab by mouth every six (6) hours as needed for Pain. Max Daily Amount: 20 mg. 12 Tab 0    vitamin e (AQUA GEMS) 200 unit capsule 200 Units.          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     Unintentional weight change       Past Surgical History:   Procedure Laterality Date    HX BREAST REDUCTION  2015    HX CARPAL TUNNEL RELEASE Bilateral 2011    HX HYSTERECTOMY  1998    HX OOPHORECTOMY      cyst removed     HX UROLOGICAL  2005    bladder suspension     Social History     Socioeconomic History    Marital status:      Spouse name: Not on file    Number of children: Not on file    Years of education: Not on file    Highest education level: Not on file   Social Needs    Financial resource strain: Not on file    Food insecurity - worry: Not on file    Food insecurity - inability: Not on file   [employer] needs - medical: Not on file   [employer] needs - non-medical: Not on file   Occupational History    Not on file   Tobacco Use    Smoking status: Never Smoker    Smokeless tobacco: Never Used   Substance and Sexual Activity    Alcohol use: Yes     Comment: daliy glass of wine    Drug use: No    Sexual activity: Yes   Other Topics Concern    Not on file   Social History Narrative    Not on file      Family History   Problem Relation Age of Onset   Azraizzfrancy Cancer Mother     Colon Cancer Mother     Heart Disease Father     Hypertension Father     Cancer Maternal Aunt     Diabetes Paternal Grandmother         Physical Examination: Well-nourished mature female in no apparent distress      Urinalysis: Negative dipstick/nitrite negative    PVR today 110 cc    Impression: Stress urinary incontinence responding favorably to rectus fascial sling                       cystourethropexy                        -Mild overactive bladder        Plan: Okay to discontinue CIC                Anticholinergic Rx contraindicated because of recent retention postop cystourethropexy RTC 6 months PVR           More than 1/2 of this 125 minute visit was spent in counselling and coordination of care, as described above. Alec Ingram MD  -electronically signed-    PLEASE NOTE:  This document has been produced using voice recognition software. Unrecognized errors in transcription may be present.

## 2018-10-31 NOTE — PATIENT INSTRUCTIONS
Urinary Retention: Care Instructions  Your Care Instructions    Urinary retention means that you aren't able to urinate. In men, it is often caused by a blockage of the urinary tract from an enlarged prostate gland. In men and women, it can also be caused by an infection or nerve damage. Or it may be a side effect of a medicine. The doctor may have drained the urine from your bladder. If you still have problems passing urine, you may need to use a catheter at home. This is used to empty your bladder until the problem can be fixed. Your doctor may put a catheter in your bladder before you go home. If so, he or she will tell you when to come back to have the catheter removed. The doctor has checked you closely. But problems can develop later. If you notice any problems or new symptoms, get medical treatment right away. Follow-up care is a key part of your treatment and safety. Be sure to make and go to all appointments, and call your doctor if you are having problems. It's also a good idea to know your test results and keep a list of the medicines you take. How can you care for yourself at home? · Take your medicines exactly as prescribed. Call your doctor if you think you are having a problem with your medicine. You will get more details on the specific medicines your doctor prescribes. · Check with your doctor before you use any over-the-counter medicines. Many cold and allergy medicines, for example, can make this problem worse. Make sure your doctor knows all of the medicines, vitamins, supplements, and herbal remedies you take. · Spread out through the day the amount of fluid you drink. Do not drink a lot at bedtime. · Avoid alcohol and caffeine. · If you have been given a catheter, or if one is already in place, follow the instructions you were given. Always wash your hands before and after you handle the catheter. When should you call for help?   Call your doctor now or seek immediate medical care if:    · You cannot urinate at all, or it is getting harder to urinate.     · You have symptoms of a urinary tract infection. These may include:  ? Pain or burning when you urinate. ? A frequent need to urinate without being able to pass much urine. ? Pain in the flank, which is just below the rib cage and above the waist on either side of the back. ? Blood in your urine. ? A fever.    Watch closely for changes in your health, and be sure to contact your doctor if:    · You have any problems with your catheter.     · You do not get better as expected. Where can you learn more? Go to http://harjit-jennifer.info/. Enter M244 in the search box to learn more about \"Urinary Retention: Care Instructions. \"  Current as of: March 21, 2018  Content Version: 11.8  © 5890-4368 Healthwise, Incorporated. Care instructions adapted under license by [employer] (which disclaims liability or warranty for this information). If you have questions about a medical condition or this instruction, always ask your healthcare professional. [place] [address] any warranty or liability for your use of this information.

## 2018-10-31 NOTE — PROGRESS NOTES
Ms. Hyatt Skip has a reminder for a \"due or due soon\" health maintenance. I have asked that she contact her primary care provider for follow-up on this health maintenance.

## 2018-11-30 ENCOUNTER — HOSPITAL ENCOUNTER (OUTPATIENT)
Dept: LAB | Age: 68
Discharge: HOME | End: 2018-11-30
Payer: MEDICARE

## 2018-11-30 PROCEDURE — 88305 TISSUE EXAM BY PATHOLOGIST: CPT

## 2019-04-12 ENCOUNTER — HOSPITAL ENCOUNTER (OUTPATIENT)
Dept: LAB | Age: 69
Discharge: HOME | End: 2019-04-12
Payer: MEDICARE

## 2019-04-12 PROCEDURE — 88305 TISSUE EXAM BY PATHOLOGIST: CPT

## 2019-05-01 ENCOUNTER — OFFICE VISIT (OUTPATIENT)
Dept: UROLOGY | Age: 69
End: 2019-05-01

## 2019-05-01 VITALS
BODY MASS INDEX: 29.08 KG/M2 | HEIGHT: 62 IN | WEIGHT: 158 LBS | HEART RATE: 88 BPM | OXYGEN SATURATION: 93 % | DIASTOLIC BLOOD PRESSURE: 76 MMHG | SYSTOLIC BLOOD PRESSURE: 113 MMHG

## 2019-05-01 DIAGNOSIS — N39.3 STRESS INCONTINENCE: Primary | ICD-10-CM

## 2019-05-01 LAB
BILIRUB UR QL STRIP: NEGATIVE
GLUCOSE UR-MCNC: NEGATIVE MG/DL
KETONES P FAST UR STRIP-MCNC: NEGATIVE MG/DL
PH UR STRIP: 7 [PH] (ref 4.6–8)
PROT UR QL STRIP: NEGATIVE
SP GR UR STRIP: 1.01 (ref 1–1.03)
UA UROBILINOGEN AMB POC: NORMAL (ref 0.2–1)
URINALYSIS CLARITY POC: CLEAR
URINALYSIS COLOR POC: YELLOW
URINE BLOOD POC: NEGATIVE
URINE LEUKOCYTES POC: NORMAL
URINE NITRITES POC: NEGATIVE

## 2019-05-01 RX ORDER — ASPIRIN 81 MG
TABLET, DELAYED RELEASE (ENTERIC COATED) ORAL
COMMUNITY

## 2019-05-01 RX ORDER — ASPIRIN 81 MG/1
TABLET ORAL DAILY
COMMUNITY

## 2019-05-01 NOTE — PATIENT INSTRUCTIONS
Stress Incontinence in Women: Care Instructions  Your Care Instructions  Stress incontinence is the accidental release of urine caused by activities that put pressure on your bladder. It may happen most often when you sneeze, cough, laugh, jog, or lift something heavy. This condition does not cause major health problems, but it can be embarrassing and interfere with your life. Treatment can cure or improve your symptoms. Follow-up care is a key part of your treatment and safety. Be sure to make and go to all appointments, and call your doctor if you are having problems. It's also a good idea to know your test results and keep a list of the medicines you take. How can you care for yourself at home? · Take your medicines exactly as prescribed. Call your doctor if you think you are having a problem with your medicine. · Limit caffeine and alcohol. They make you urinate more. · Do pelvic floor (Kegel) exercises, which tighten and strengthen pelvic muscles. To do Kegel exercises:  ? Squeeze the same muscles you would use to stop your urine. Your belly and thighs should not move. ? Hold the squeeze for 3 seconds, and then relax for 3 seconds. ? Start with 3 seconds. Then add 1 second each week until you are able to squeeze for 10 seconds. ? Repeat the exercise 10 to 15 times a session. Do three or more sessions a day. · Try wearing pads that absorb leaks. Or you may want to try to prevent leaks with a product like Poise Impressa, which you insert like a tampon. · Keep skin in the genital area dry. Petroleum jelly (like Vaseline) spread on the area may help protect your skin. When should you call for help? Call your doctor now or seek immediate medical care if:    · You have new urinary symptoms.  These may include leaking urine, having pain when urinating, or feeling like you need to urinate often.    Watch closely for changes in your health, and be sure to contact your doctor if:    · You do not get better as expected. Where can you learn more? Go to http://harjit-jennifer.info/. Enter S234 in the search box to learn more about \"Stress Incontinence in Women: Care Instructions. \"  Current as of: May 14, 2018  Content Version: 11.9  © 9900-4835 [employer]. Care instructions adapted under license by [employer] (which disclaims liability or warranty for this information). If you have questions about a medical condition or this instruction, always ask your healthcare professional. [address] any warranty or liability for your use of this information.

## 2019-05-01 NOTE — PROGRESS NOTES
Ms. Johny Dasilva has a reminder for a \"due or due soon\" health maintenance. I have asked that she contact her primary care provider for follow-up on this health maintenance.

## 2019-05-02 NOTE — PROGRESS NOTES
Elia Boxer 76 y.o. female     Ms. Kimberly Forrest seen today for follow-up stress urinary incontinence status post anterior rectus sling cystourethropexy in 2018    No patient is voiding normally and has episodes of stress incontinence  No difficulty initiating or maintaining urinary stream  anterior rectus fascial sling cystourethropexy 2018 with postop course complicated by persistent retention requiring indwelling Chandler catheter drainage and subsequent CIC-patient has performed at nighttime CIC now draining 1 or 2 ounces per catheterization  No complaints voiding except for mild urgency sensation when changing from sitting to standing position     Lattie Luther sound passage on 2018 shows no anatomic obstruction of the urethra     involuntary loss of bladder control when performing Valsalva inducing physical exertions worsening slowly over the past 3-5 years-now using one pad per day  Kegel exercise program has been ineffective in relieving HAYDEN      No history of  tract disease, trauma, or surgery  No dysuria urgency or urgency incontinence  No neurologic symptoms      [facility]   Anterior-posterior repair of pelvic laxity  Dr. Bandar Gilmore      cc on 2018   cc  2018   cc on 2018  PVR 69 cc in May 2019     eview of Systems:   CNS: No seizure syncope headaches dizziness or visual changes  Respiratory: No wheezing cough shortness of breath or chest pain  Cardiovascular: No angina no palpitations  Intestinal: No dyspepsia diarrhea or constipation  Urinary: No urgency frequency dysuria or hematuria  Skeletal: No bone or joint pain  Endocrine: No diabetes or thyroid disease  Other:                                                                                    3 para 2  ×2  (max birth weight 7.4)                   Allergies:    Allergies   Allergen Reactions    Nitrofurantoin Macrocrystalline Rash and Itching    Shellfish Containing Products Hives and Rash      Medications:    Current Outpatient Medications   Medication Sig Dispense Refill    aspirin delayed-release 81 mg tablet Take  by mouth daily.  docusate sodium (STOOL SOFTENER) 100 mg tab Take  by mouth.  pravastatin (PRAVACHOL) 40 mg tablet 40 mg.      melatonin 10 mg tab Take  by mouth nightly as needed.  losartan (COZAAR) 100 mg tablet daily.  omeprazole (PRILOSEC) 20 mg capsule       therapeutic multivitamin (THERAGRAN) tablet Take 1 Tab by Mouth Once a Day.  ascorbic acid, vitamin C, (VITAMIN C) 250 mg tablet 500 mg.      omega 3-dha-epa-fish oil (FISH OIL) 60- mg cap 300 mg.      BIFIDOBACTERIUM INFANTIS PO Take  by Mouth Once a Day.  PSYLLIUM SEED, WITH DEXTROSE, PO Take  by Mouth Once a Day. Indications: Pt taking 2 capsule po every day      MILK THISTLE PO 1,000 mg.  tamsulosin (FLOMAX) 0.4 mg capsule Take 1 Cap by mouth daily. 30 Cap 3    oxyCODONE IR (ROXICODONE) 5 mg immediate release tablet Take 1 Tab by mouth every six (6) hours as needed for Pain. Max Daily Amount: 20 mg. 12 Tab 0    vitamin e (AQUA GEMS) 200 unit capsule 200 Units.          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     Unintentional weight change       Past Surgical History:   Procedure Laterality Date    HX BREAST REDUCTION  2015    HX CARPAL TUNNEL RELEASE Bilateral 2011    HX HYSTERECTOMY  1998    HX OOPHORECTOMY      cyst removed     HX UROLOGICAL  2005    bladder suspension     Social History     Socioeconomic History    Marital status:      Spouse name: Not on file    Number of children: Not on file    Years of education: Not on file    Highest education level: Not on file   Occupational History    Not on file   Social Needs    Financial resource strain: Not on file    Food insecurity:     Worry: Not on file     Inability: Not on file    Transportation needs:     Medical: Not on file     Non-medical: Not on file   Tobacco Use    Smoking status: Never Smoker    Smokeless tobacco: Never Used   Substance and Sexual Activity    Alcohol use: Yes     Comment: naidaiy glass of wine    Drug use: No    Sexual activity: Yes   Lifestyle    Physical activity:     Days per week: Not on file     Minutes per session: Not on file    Stress: Not on file   Relationships    Social connections:     Talks on phone: Not on file     Gets together: Not on file     Attends Zoroastrianism service: Not on file     Active member of club or organization: Not on file     Attends meetings of clubs or organizations: Not on file     Relationship status: Not on file    Intimate partner violence:     Fear of current or ex partner: Not on file     Emotionally abused: Not on file     Physically abused: Not on file     Forced sexual activity: Not on file   Other Topics Concern    Not on file   Social History Narrative    Not on file      Family History   Problem Relation Age of Onset    Cancer Mother     Colon Cancer Mother     Heart Disease Father     Hypertension Father     Cancer Maternal Aunt     Diabetes Paternal Grandmother         Physical Examination: Well-nourished mature female in no apparent distress    Urinalysis: PVR today negative dipstick/nitrite negative/heme-negative     PVR today 69 cc    Impression: Stress urinary incontinence responding favorably to rectus sling cystourethropexy        Plan: RTC as needed symptoms of urinary tract disease      More than 1/2 of this 15 minute visit was spent in counselling and coordination of care, as described above.   Nadira Quintanilla MD  -electronically signed-    PLEASE NOTE:  This document has been produced using voice recognition software. Unrecognized errors in transcription may be present.

## 2020-06-16 ENCOUNTER — HOSPITAL ENCOUNTER (OUTPATIENT)
Dept: LAB | Age: 70
Discharge: HOME | End: 2020-06-16
Payer: MEDICARE

## 2020-06-16 PROCEDURE — 88305 TISSUE EXAM BY PATHOLOGIST: CPT

## 2021-01-05 ENCOUNTER — HOSPITAL ENCOUNTER (OUTPATIENT)
Dept: LAB | Age: 71
Discharge: HOME | End: 2021-01-05
Payer: MEDICARE

## 2021-01-05 PROCEDURE — 88305 TISSUE EXAM BY PATHOLOGIST: CPT

## 2021-02-26 ENCOUNTER — HOSPITAL ENCOUNTER (OUTPATIENT)
Dept: LAB | Age: 71
Discharge: HOME | End: 2021-02-26
Payer: MEDICARE

## 2021-02-26 PROCEDURE — 88331 PATH CONSLTJ SURG 1 BLK 1SPC: CPT

## 2021-02-26 PROCEDURE — 88305 TISSUE EXAM BY PATHOLOGIST: CPT

## 2021-04-23 ENCOUNTER — HOSPITAL ENCOUNTER (OUTPATIENT)
Dept: LAB | Age: 71
Discharge: HOME | End: 2021-04-23
Payer: MEDICARE

## 2021-04-23 PROCEDURE — 88305 TISSUE EXAM BY PATHOLOGIST: CPT

## 2021-04-23 PROCEDURE — 88331 PATH CONSLTJ SURG 1 BLK 1SPC: CPT

## 2021-10-19 ENCOUNTER — HOSPITAL ENCOUNTER (OUTPATIENT)
Dept: LAB | Age: 71
Discharge: HOME | End: 2021-10-19
Payer: MEDICARE

## 2021-10-19 PROCEDURE — 88305 TISSUE EXAM BY PATHOLOGIST: CPT

## 2021-10-19 PROCEDURE — 88342 IMHCHEM/IMCYTCHM 1ST ANTB: CPT

## 2022-06-07 ENCOUNTER — HOSPITAL ENCOUNTER (OUTPATIENT)
Dept: LAB | Age: 72
Discharge: HOME | End: 2022-06-07
Payer: MEDICARE

## 2022-06-07 PROCEDURE — 88305 TISSUE EXAM BY PATHOLOGIST: CPT

## 2022-06-24 ENCOUNTER — HOSPITAL ENCOUNTER (OUTPATIENT)
Dept: LAB | Age: 72
Discharge: HOME | End: 2022-06-24

## 2022-06-24 ENCOUNTER — HOSPITAL ENCOUNTER (OUTPATIENT)
Dept: LAB | Age: 72
Discharge: HOME | End: 2022-06-24
Payer: MEDICARE

## 2022-06-24 PROCEDURE — 88305 TISSUE EXAM BY PATHOLOGIST: CPT

## 2022-06-24 PROCEDURE — 88331 PATH CONSLTJ SURG 1 BLK 1SPC: CPT

## 2023-06-06 ENCOUNTER — HOSPITAL ENCOUNTER (OUTPATIENT)
Facility: HOSPITAL | Age: 73
Setting detail: RECURRING SERIES
Discharge: HOME | End: 2023-06-09
Payer: MEDICARE

## 2023-06-06 PROCEDURE — 97161 PT EVAL LOW COMPLEX 20 MIN: CPT

## 2023-06-06 PROCEDURE — 97530 THERAPEUTIC ACTIVITIES: CPT

## 2023-06-06 NOTE — PROGRESS NOTES
[] No  How often do you get headaches? How long does the headache last?  What aggravates it? What relieves it? Does the headache coincide with any other symptoms (visual disturbances, light sensitivity)? Where is the headache? Does it change locations?   Other:    OBJECTIVE  Posture: fairly good  Head Position:  Shoulder/Scapular Position:  C-Kyphosis:  [] increased   [] decreased   C-Lordosis:   [] increased   [] decreased  T-Kyphosis:  [] increased   [] decreased  T-Lordosis:   [] increased   [] decreased     TMJ: [] N/A [] Abnormal - ROM:   Palpation:    Cervical Retraction: [] WNL    [x] Abnormal: decreased ROM, good stretching feeling per pt's report    Shoulder/Scapular Screen: [] WNL    [] Abnormal:    Active Movements: [] N/A   [] Too acute   [] Other:  ROM % AROM % PROM Comments:pain, area   Forward flexion WFL     Extension 50%     SB right 30%     SB left  30%     Rotation right WFL     Rotation left WFL       Thoracic Spine: [] N/A    [] WNL   [] Other: min stiffness    PROM:    Palpation:  [] Min  [x] Mod  [] Severe    Location: soreness along Left latissimus & paraspinal/erector spinae muscles  [] Min  [] Mod  [] Severe    Location:  [] Min  [] Mod  [] Severe    Location:    Neuro Screen (myotome/dematome/felexes): [x] WNL  Myotome Level Muscle Test Myotome Level Muscle Test   C5 Shoulder Adduction - Deltoid C8 Finger Flexors   C6 Wrist Extension T1 Finger Abduction - Interossei   C7 Elbow Extension     Comments:  Upper Limb Tension Tests: [] N/A       Ulnar: [] R    [] L    [] +    [] -       Median: [] R    [] L    [] +    [] -       Radial: [] R    [] L    [] +    [] -    Special Tests:  Cervical:        Vertebral Artery:  [] R    [] L    [] +    [] -       Alar Ligament: [] R    [] L    [] +    [] -       Transverse Lig: [] R    [] L    [] +    [] -       Spurling's:  [] R    [] L    [] +    [] -       Distraction:  [] R    [] L    [] +    [x] -       Compression: [] R    [] L    [] +    [x]

## 2023-06-06 NOTE — THERAPY EVALUATION
will report A little difficulty with Performing usual house work to improve her QOL. Eval status: Mod difficulty              Frequency / Duration: Patient to be seen 2-3 times per week for 8 weeks    Patient/ Caregiver education and instruction: Diagnosis, prognosis, self care, activity modification, brace/ splint application, and exercises [x]  Plan of care has been reviewed with PTA    Certification Period: 6-6-2023 to 8-4-2023    Ila Aldana, PT       6/6/2023       9:03 AM  ===================================================================  I certify that the above Therapy Services are being furnished while the patient is under my care. I agree with the treatment plan and certify that this therapy is necessary. [de-identified] Signature:_________________________   DATE:_________   TIME:________                           Lawerence Dose, PA    ** Signature, Date and Time must be completed for valid certification **  [facility] or you may fax the signed copy to [phone]. Thank you.

## 2023-06-08 ENCOUNTER — APPOINTMENT (OUTPATIENT)
Facility: HOSPITAL | Age: 73
End: 2023-06-08
Payer: MEDICARE

## 2023-06-21 ENCOUNTER — HOSPITAL ENCOUNTER (OUTPATIENT)
Facility: HOSPITAL | Age: 73
Setting detail: RECURRING SERIES
Discharge: HOME | End: 2023-06-24
Payer: MEDICARE

## 2023-06-21 PROCEDURE — 97110 THERAPEUTIC EXERCISES: CPT

## 2023-06-21 PROCEDURE — 97112 NEUROMUSCULAR REEDUCATION: CPT

## 2023-06-21 NOTE — PROGRESS NOTES
PHYSICAL / OCCUPATIONAL THERAPY - DAILY TREATMENT NOTE (updated )    Patient Name: Eliot Rodríguez    Date: 2023    : 1950  Insurance: Payor: MEDICARE / Plan: MEDICARE PART A AND B / Product Type: *No Product type* /      Patient  verified Yes     Visit #   Current / Total 2 24   Time   In / Out 8:40  9:18   Pain   In / Out 0/10 0/10   Subjective Functional Status/Changes: Pt. Reports she is doing better after her injection. She continues to have pain with certain movements but exercises help. TREATMENT AREA =  Cervicalgia [M54.2]    OBJECTIVE    Therapeutic Procedures: Tx Min Billable or 1:1 Min (if diff from Tx Min) Procedure, Rationale, Specifics   30  79925 Therapeutic Exercise (timed):  increase ROM, strength, coordination, balance, and proprioception to improve patient's ability to progress to PLOF and address remaining functional goals. (see flow sheet as applicable)     Details if applicable:  see flow sheet     8  39769 Neuromuscular Re-Education (timed):  improve balance, coordination, kinesthetic sense, posture, core stability and proprioception to improve patient's ability to develop conscious control of individual muscles and awareness of position of extremities in order to progress to PLOF and address remaining functional goals.  (see flow sheet as applicable)     Details if applicable:  facilitated lower trap, cervical retractions           Details if applicable:            Details if applicable:            Details if applicable:     45  [facility] Totals Reminder: bill using total billable min of TIMED therapeutic procedures (example: do not include dry needle or estim unattended, both untimed codes, in totals to left)  8-22 min = 1 unit; 23-37 min = 2 units; 38-52 min = 3 units; 53-67 min = 4 units; 68-82 min = 5 units   Total Total     [x]  Patient Education billed concurrently with other procedures   [x] Review HEP    [] Progressed/Changed HEP, detail:    [] Other detail:

## 2023-06-28 ENCOUNTER — HOSPITAL ENCOUNTER (OUTPATIENT)
Facility: HOSPITAL | Age: 73
Setting detail: RECURRING SERIES
Discharge: HOME | End: 2023-07-01
Payer: MEDICARE

## 2023-06-28 PROCEDURE — 97110 THERAPEUTIC EXERCISES: CPT

## 2023-06-28 PROCEDURE — 97112 NEUROMUSCULAR REEDUCATION: CPT

## 2023-06-29 ENCOUNTER — HOSPITAL ENCOUNTER (OUTPATIENT)
Facility: HOSPITAL | Age: 73
Setting detail: RECURRING SERIES
End: 2023-06-29
Payer: MEDICARE

## 2023-06-29 PROCEDURE — 97110 THERAPEUTIC EXERCISES: CPT

## 2023-06-29 PROCEDURE — 97112 NEUROMUSCULAR REEDUCATION: CPT

## 2023-07-10 ENCOUNTER — HOSPITAL ENCOUNTER (OUTPATIENT)
Facility: HOSPITAL | Age: 73
Setting detail: RECURRING SERIES
Discharge: HOME | End: 2023-07-13
Payer: MEDICARE

## 2023-07-10 PROCEDURE — 97110 THERAPEUTIC EXERCISES: CPT

## 2023-07-10 PROCEDURE — 97112 NEUROMUSCULAR REEDUCATION: CPT

## 2023-07-10 PROCEDURE — 97530 THERAPEUTIC ACTIVITIES: CPT

## 2023-07-10 NOTE — PROGRESS NOTES
PHYSICAL / OCCUPATIONAL THERAPY - DAILY TREATMENT NOTE (updated )    Patient Name: Diane Mobley    Date: 7/10/2023    : 1950  Insurance: Payor: MEDICARE / Plan: MEDICARE PART A AND B / Product Type: *No Product type* /      Patient  verified Yes     Visit #   Current / Total 5 24   Time   In / Out 8:44 9:24   Pain   In / Out 0/10 0/10   Subjective Functional Status/Changes: Pt reports only time she got increased pain along her back was when packing from her trip. She was able to change positions and ice and the symptoms centralized. When the pain comes on along the nerve it is 8/10 but all other times she has no pain. She does have to do laundry but knows to take breaks to reduce the nerve irritation. She is going to get back to doing her exercises this week since she is home from vacation. TREATMENT AREA =  Cervicalgia [M54.2]    OBJECTIVE    Therapeutic Procedures: Tx Min Billable or 1:1 Min (if diff from Tx Min) Procedure, Rationale, Specifics   10   72364 Therapeutic Exercise (timed):  increase ROM, strength, coordination, balance, and proprioception to improve patient's ability to progress to PLOF and address remaining functional goals. (see flow sheet as applicable)      Details if applicable:  see flow sheet      15  10165 Therapeutic Activity (timed):  use of dynamic activities replicating functional movements to increase ROM, strength, coordination, balance, and proprioception in order to improve patient's ability to progress to PLOF and address remaining functional goals.   (see flow sheet as applicable)      Details if applicable:  see flow sheet; goal reassessment   15   90482 Neuromuscular Re-Education (timed):  improve balance, coordination, kinesthetic sense, posture, core stability and proprioception to improve patient's ability to develop conscious control of individual muscles and awareness of position of extremities in order to progress to PLOF and address remaining

## 2023-07-10 NOTE — PROGRESS NOTES
[facility] - Alex Thompson  [address] [facility] [place]  [phone] fax    Medicare Progress Report    Patient name: Elliott Cantu Start of Care: 2023   Referral source: Melanie Ferguson : 1950   Medical/Treatment Diagnosis: Cervicalgia [M54.2]  Payor: MEDICARE / Plan: MEDICARE PART A AND B / Product Type: *No Product type* /  Onset Date: Worsened during the last several months     Prior Hospitalization: see medical history Provider#: [id number]   Comorbidities: Alcohol use, hysterectomy & bladder lift, HTN, skin cancer  Prior Level of Function:mod ind, Left handed, gardening, walking, housekeeping. Visits from Start of Care: 5    Missed Visits: 0    Reporting Period: 2023 to 07/10/2023    Subjective Reports: Pt is able to independently centralize symptoms by changing her neck position to neutral or c/s retraction. Pt reports most difficulty with looking down activities like doing laundry, cutting vegetables for cooking, and making her bed. She has good relief with ice as well. She was on vacation for a week but is getting back to doing her exercises this week. Current Status/Treatment Goals:    Short term goals: To be accomplished within 4 weeks  1. Pt will be independent with HEP to maintain progression. Eval status: given HEP  Current Status: met     2. Pt will improve lower trap strength to at least 4/5 to improve ease with ADLs. Eval status: 3+/5  Current Status: not met     3. Pt will report at least 50% improvement of pain to improve her QOL. Eval status: 2-8/10, intermittent pain  Current Status: met    Key functional changes: centralizing symptoms, improved posture awareness, 50% improvement      Problems/ barriers to goal attainment: decreased posterior kinetic chain strength/endurance     Assessment / Recommendations:Mrs. Domonique Domingo is making steady progress in 5 visits of skilled physical therapy meeting 2/3 STGs reporting

## 2023-07-13 ENCOUNTER — HOSPITAL ENCOUNTER (OUTPATIENT)
Facility: HOSPITAL | Age: 73
Setting detail: RECURRING SERIES
Discharge: HOME | End: 2023-07-16
Payer: MEDICARE

## 2023-07-13 ENCOUNTER — APPOINTMENT (OUTPATIENT)
Facility: HOSPITAL | Age: 73
End: 2023-07-13
Payer: MEDICARE

## 2023-07-13 PROCEDURE — 97110 THERAPEUTIC EXERCISES: CPT

## 2023-07-13 PROCEDURE — 97112 NEUROMUSCULAR REEDUCATION: CPT

## 2023-07-17 ENCOUNTER — HOSPITAL ENCOUNTER (OUTPATIENT)
Facility: HOSPITAL | Age: 73
Setting detail: RECURRING SERIES
Discharge: HOME | End: 2023-07-20
Payer: MEDICARE

## 2023-07-17 PROCEDURE — 97110 THERAPEUTIC EXERCISES: CPT

## 2023-07-17 PROCEDURE — 97112 NEUROMUSCULAR REEDUCATION: CPT

## 2023-07-17 NOTE — PROGRESS NOTES
PHYSICAL / OCCUPATIONAL THERAPY - DAILY TREATMENT NOTE (updated )    Patient Name: Ellie Ziegler    Date: 2023    : 1950  Insurance: Payor: MEDICARE / Plan: MEDICARE PART A AND B / Product Type: *No Product type* /      Patient  verified Yes     Visit #   Current / Total 7 24   Time   In / Out 8:43 9:34   Pain   In / Out 0/10 0/10   Subjective Functional Status/Changes: Pt reports no current pain. She has laundry to get done today. She feels overall improvement. She still notes difficulty with bending activities like making the bed, laundry and cooking. Pt was told by PA she would be trying mechanical traction to see about getting a home unit. TREATMENT AREA =  Cervicalgia [M54.2]    OBJECTIVE    Modalities Rationale:     decrease inflammation and decrease pain to improve patient's ability to progress to PLOF and address remaining functional goals. min [] Estim Unattended, type/location:                                      []  w/ice    []  w/heat    min [] Estim Attended, type/location:                                     []  w/US     []  w/ice    []  w/heat    []  TENS insruct      min []  Mechanical Traction: type/lbs                   []  pro   []  sup   []  int   []  cont    []  before manual    []  after manual    min []  Ultrasound, settings/location:      min []  Iontophoresis w/ dexamethasone, location:                                               []  take home patch       []  in clinic   10 min  unbill [x]  Ice     []  Heat    location/position: Seated   neck    min []  Paraffin,  details:     min []  Vasopneumatic Device, press/temp:     min []  Mahogany Ponsford / Petar Hammed:     If using vaso (only need to measure limb vaso being performed on)      pre-treatment girth :       post-treatment girth :       measured at (landmark location) :      min []  Other:    Skin assessment post-treatment (if applicable):    [x]  intact    []  redness- no adverse reaction                 []redness

## 2023-07-20 ENCOUNTER — HOSPITAL ENCOUNTER (OUTPATIENT)
Facility: HOSPITAL | Age: 73
Setting detail: RECURRING SERIES
End: 2023-07-20
Payer: MEDICARE

## 2023-07-25 ENCOUNTER — HOSPITAL ENCOUNTER (OUTPATIENT)
Facility: HOSPITAL | Age: 73
Setting detail: RECURRING SERIES
Discharge: HOME | End: 2023-07-28
Payer: MEDICARE

## 2023-07-25 PROCEDURE — 97110 THERAPEUTIC EXERCISES: CPT

## 2023-07-25 PROCEDURE — 97012 MECHANICAL TRACTION THERAPY: CPT

## 2023-07-25 PROCEDURE — 97112 NEUROMUSCULAR REEDUCATION: CPT

## 2023-07-25 NOTE — PROGRESS NOTES
PHYSICAL / OCCUPATIONAL THERAPY - DAILY TREATMENT NOTE (updated )    Patient Name: Janay Norris    Date: 2023    : 1950  Insurance: Payor: MEDICARE / Plan: MEDICARE PART A AND B / Product Type: *No Product type* /      Patient  verified Yes     Visit #   Current / Total 8 24   Time   In / Out 9:59 10:45   Pain   In / Out 0 0   Subjective Functional Status/Changes: Pt states she had diverticulitis last week and was unable to come in. Pt reports just a little bit of neck pain with making the bed this morning but states it did not last long. TREATMENT AREA =  Cervicalgia [M54.2]    OBJECTIVE    Modalities Rationale:     decrease pain and increase tissue extensibility to improve patient's ability to progress to PLOF and address remaining functional goals. min [] Estim Unattended, type/location:                                      []  w/ice    []  w/heat    min [] Estim Attended, type/location:                                     []  w/US     []  w/ice    []  w/heat    []  TENS insruct     10 min [x]  Mechanical Traction: type/lbs Cervical/ #12                  []  pro   [x]  sup   []  int   [x]  cont    []  before manual    []  after manual    min []  Ultrasound, settings/location:      min []  Iontophoresis w/ dexamethasone, location:                                               []  take home patch       []  in clinic    min  unbill []  Ice     []  Heat    location/position:     min []  Paraffin,  details:     min []  Vasopneumatic Device, press/temp:     min []  Raenette Brisk / Thena Boubacar: If using vaso (only need to measure limb vaso being performed on)      pre-treatment girth :       post-treatment girth :       measured at (landmark location) :      min []  Other:    Skin assessment post-treatment (if applicable):    []  intact    []  redness- no adverse reaction                 []redness - adverse reaction:         Therapeutic Procedures:     Tx Min Billable or 1:1 Min (if diff from

## 2023-07-27 ENCOUNTER — HOSPITAL ENCOUNTER (OUTPATIENT)
Facility: HOSPITAL | Age: 73
Setting detail: RECURRING SERIES
Discharge: HOME | End: 2023-07-30
Payer: MEDICARE

## 2023-07-27 PROCEDURE — 97110 THERAPEUTIC EXERCISES: CPT

## 2023-07-27 PROCEDURE — 97112 NEUROMUSCULAR REEDUCATION: CPT

## 2023-07-27 PROCEDURE — 97012 MECHANICAL TRACTION THERAPY: CPT

## 2023-07-27 NOTE — PROGRESS NOTES
PHYSICAL / OCCUPATIONAL THERAPY - DAILY TREATMENT NOTE (updated )    Patient Name: Graham Wolf    Date: 2023    : 1950  Insurance: Payor: MEDICARE / Plan: MEDICARE PART A AND B / Product Type: *No Product type* /      Patient  verified Yes     Visit #   Current / Total 9 24   Time   In / Out [phone]   Pain   In / Out 0-/10 010   Subjective Functional Status/Changes: Pt stated that she is doing good today     TREATMENT AREA =  Cervicalgia [M54.2]    OBJECTIVE    Modalities Rationale:     decrease pain and increase tissue extensibility to improve patient's ability to progress to PLOF and address remaining functional goals. min [] Estim Unattended, type/location:                                      []  w/ice    []  w/heat    min [] Estim Attended, type/location:                                     []  w/US     []  w/ice    []  w/heat    []  TENS insruct     15 min [x]  Mechanical Traction: type/lbs                   []  pro   []  sup   [x]  int   []  cont    []  before manual    []  after manual    min []  Ultrasound, settings/location:      min []  Iontophoresis w/ dexamethasone, location:                                               []  take home patch       []  in clinic    min  unbill []  Ice     []  Heat    location/position:     min []  Paraffin,  details:     min []  Vasopneumatic Device, press/temp:     min []  Yolanda Burner / Corrina Ramirez: If using vaso (only need to measure limb vaso being performed on)      pre-treatment girth :       post-treatment girth :       measured at (landmark location) :      min []  Other:    Skin assessment post-treatment (if applicable):    []  intact    []  redness- no adverse reaction                 []redness - adverse reaction:         Therapeutic Procedures:     Tx Min Billable or 1:1 Min (if diff from Tx Min) Procedure, Rationale, Specifics   22  73029 Therapeutic Exercise (timed):  increase ROM, strength, coordination, balance, and

## 2023-07-31 ENCOUNTER — HOSPITAL ENCOUNTER (OUTPATIENT)
Facility: HOSPITAL | Age: 73
Setting detail: RECURRING SERIES
Discharge: HOME | End: 2023-08-03
Payer: MEDICARE

## 2023-07-31 PROCEDURE — 97012 MECHANICAL TRACTION THERAPY: CPT

## 2023-07-31 PROCEDURE — 97110 THERAPEUTIC EXERCISES: CPT

## 2023-07-31 NOTE — PROGRESS NOTES
increase ROM, strength, coordination, balance, and proprioception to improve patient's ability to progress to PLOF and address remaining functional goals. (see flow sheet as applicable)     Details if applicable:       27  [facility] Totals Reminder: bill using total billable min of TIMED therapeutic procedures (example: do not include dry needle or estim unattended, both untimed codes, in totals to left)  8-22 min = 1 unit; 23-37 min = 2 units; 38-52 min = 3 units; 53-67 min = 4 units; 68-82 min = 5 units   Total Total     [x]  Patient Education billed concurrently with other procedures   [x] Review HEP    [] Progressed/Changed HEP, detail:    [] Other detail:       Objective Information/Functional Measures/Assessment  No difficulty with exercises  No complaint of increased pain during session     Pt is making slow progress toward goals. Pt cont with mild pain in the neck and upper back. Cont to report having some difficulty with performing some ADLs and household chores. Patient will continue to benefit from skilled PT / OT services to modify and progress therapeutic interventions, analyze and address functional mobility deficits, analyze and address ROM deficits, analyze and address strength deficits, analyze and address soft tissue restrictions, analyze and cue for proper movement patterns, analyze and modify for postural abnormalities, and instruct in home and community integration to address functional deficits and attain remaining goals. Progress toward goals / Updated goals:  [x]  See Progress Note/Recertification    1. Pt will improve lower trap strength to at least 4/5 to improve ease with ADLs. Eval status: left 3/5; right 3-/5  Progressing.  7/27/23    Next PN/ RC due 8/8/23    PLAN  Yes  Continue plan of care  [x]  Upgrade activities as tolerated  []  Discharge due to :  []  Other:    Lilly Vieira, PTA    7/31/2023    6:54 AM    Future Appointments   Date Time Provider [id number]  46[facility]

## 2023-08-02 ENCOUNTER — HOSPITAL ENCOUNTER (OUTPATIENT)
Facility: HOSPITAL | Age: 73
Setting detail: RECURRING SERIES
Discharge: HOME | End: 2023-08-05
Payer: MEDICARE

## 2023-08-02 PROCEDURE — 97012 MECHANICAL TRACTION THERAPY: CPT

## 2023-08-02 PROCEDURE — 97110 THERAPEUTIC EXERCISES: CPT

## 2023-08-02 PROCEDURE — 97112 NEUROMUSCULAR REEDUCATION: CPT

## 2023-08-02 NOTE — PROGRESS NOTES
PHYSICAL / OCCUPATIONAL THERAPY - DAILY TREATMENT NOTE (updated )    Patient Name: Mckinley Spring    Date: 2023    : 1950  Insurance: Payor: MEDICARE / Plan: MEDICARE PART A AND B / Product Type: *No Product type* /      Patient  verified Yes     Visit #   Current / Total 11 24   Time   In / Out 1040 1125   Pain   In / Out 0/10 0/10   Subjective Functional Status/Changes: Pt stated that she had a lot of pain yesterday and had last night as she was in the ER with her  until late. Reported not having pain right now, but the pain was a 3-4/10 this morning while she was putting dishes into the        TREATMENT AREA =  Cervicalgia [M54.2]    OBJECTIVE    Modalities Rationale:     decrease pain and increase tissue extensibility to improve patient's ability to progress to PLOF and address remaining functional goals. min [] Estim Unattended, type/location:                                      []  w/ice    []  w/heat    min [] Estim Attended, type/location:                                     []  w/US     []  w/ice    []  w/heat    []  TENS insruct     15 min [x]  Mechanical Traction: type/lbs                   []  pro   [x]  sup   [x]  int   []  cont    []  before manual    []  after manual    min []  Ultrasound, settings/location:      min []  Iontophoresis w/ dexamethasone, location:                                               []  take home patch       []  in clinic    min  unbill []  Ice     []  Heat    location/position:     min []  Paraffin,  details:     min []  Vasopneumatic Device, press/temp:     min []  Jose Ordonez / Brianne Larisa:     If using vaso (only need to measure limb vaso being performed on)      pre-treatment girth :       post-treatment girth :       measured at (landmark location) :      min []  Other:    Skin assessment post-treatment (if applicable):    [x]  intact    []  redness- no adverse reaction                 []redness - adverse reaction:

## 2023-08-04 ENCOUNTER — HOSPITAL ENCOUNTER (OUTPATIENT)
Facility: HOSPITAL | Age: 73
Setting detail: RECURRING SERIES
Discharge: HOME | End: 2023-08-07
Payer: MEDICARE

## 2023-08-04 PROCEDURE — 97012 MECHANICAL TRACTION THERAPY: CPT

## 2023-08-04 PROCEDURE — 97110 THERAPEUTIC EXERCISES: CPT

## 2023-08-04 PROCEDURE — 97112 NEUROMUSCULAR REEDUCATION: CPT

## 2023-08-04 NOTE — PROGRESS NOTES
PHYSICAL / OCCUPATIONAL THERAPY - DAILY TREATMENT NOTE (updated )    Patient Name: Argenis Storey    Date: 2023    : 1950  Insurance: Payor: MEDICARE / Plan: MEDICARE PART A AND B / Product Type: *No Product type* /      Patient  verified Yes     Visit #   Current / Total 12 24   Time   In / Out 10:00 10:58   Pain   In / Out 0/10 010   Subjective Functional Status/Changes: Pt. Reports still having pain with certain things like making her bed and she does exercises to decrease pain again. TREATMENT AREA =  Cervicalgia [M54.2]    OBJECTIVE    Modalities Rationale:     decrease pain to improve patient's ability to progress to PLOF and address remaining functional goals. min [] Estim Unattended, type/location:                                      []  w/ice    []  w/heat    min [] Estim Attended, type/location:                                     []  w/US     []  w/ice    []  w/heat    []  TENS insruct     15 min [x]  Mechanical Traction: type/lbs 18/10#                  []  pro   [x]  sup   [x]  int   []  cont    []  before manual    []  after manual    min []  Ultrasound, settings/location:      min []  Iontophoresis w/ dexamethasone, location:                                               []  take home patch       []  in clinic    min  unbill []  Ice     []  Heat    location/position:     min []  Paraffin,  details:     min []  Vasopneumatic Device, press/temp:     min []  Starleen Christi / Verlon Brightly: If using vaso (only need to measure limb vaso being performed on)      pre-treatment girth :       post-treatment girth :       measured at (landmark location) :      min []  Other:    Skin assessment post-treatment (if applicable):    []  intact    []  redness- no adverse reaction                 []redness - adverse reaction:         Therapeutic Procedures:     Tx Min Billable or 1:1 Min (if diff from Tx Min) Procedure, Rationale, Specifics   33  [id number] Therapeutic Exercise (timed):  increase ROM,

## 2023-08-07 ENCOUNTER — HOSPITAL ENCOUNTER (OUTPATIENT)
Facility: HOSPITAL | Age: 73
Setting detail: RECURRING SERIES
Discharge: HOME | End: 2023-08-10
Payer: MEDICARE

## 2023-08-07 PROCEDURE — 97110 THERAPEUTIC EXERCISES: CPT

## 2023-08-07 PROCEDURE — 97535 SELF CARE MNGMENT TRAINING: CPT

## 2023-08-07 PROCEDURE — 97012 MECHANICAL TRACTION THERAPY: CPT

## 2023-08-07 PROCEDURE — 97530 THERAPEUTIC ACTIVITIES: CPT

## 2023-08-09 ENCOUNTER — HOSPITAL ENCOUNTER (OUTPATIENT)
Facility: HOSPITAL | Age: 73
Setting detail: RECURRING SERIES
Discharge: HOME | End: 2023-08-12
Payer: MEDICARE

## 2023-08-09 PROCEDURE — 97012 MECHANICAL TRACTION THERAPY: CPT

## 2023-08-09 PROCEDURE — 97112 NEUROMUSCULAR REEDUCATION: CPT

## 2023-08-09 PROCEDURE — 97110 THERAPEUTIC EXERCISES: CPT

## 2023-08-09 NOTE — PROGRESS NOTES
PHYSICAL / OCCUPATIONAL THERAPY - DAILY TREATMENT NOTE (updated )    Patient Name: Mckinley Spring    Date: 2023    : 1950  Insurance: Payor: MEDICARE / Plan: MEDICARE PART A AND B / Product Type: *No Product type* /      Patient  verified Yes     Visit #   Current / Total 1 16   Time   In / Out 920 1005   Pain   In / Out 0/10 0/10   Subjective Functional Status/Changes: Pt stated that the pull on the traction last visit was good. Reported having no pain today     TREATMENT AREA =  Cervicalgia [M54.2]    OBJECTIVE    Modalities Rationale:     increase tissue extensibility to improve patient's ability to progress to PLOF and address remaining functional goals. min [] Estim Unattended, type/location:                                      []  w/ice    []  w/heat    min [] Estim Attended, type/location:                                     []  w/US     []  w/ice    []  w/heat    []  TENS insruct     15 min [x]  Mechanical Traction: type/lbs Cervical, 20/15#                  []  pro   [x]  sup   []  int   []  cont    []  before manual    []  after manual    min []  Ultrasound, settings/location:      min []  Iontophoresis w/ dexamethasone, location:                                               []  take home patch       []  in clinic    min  unbill []  Ice     []  Heat    location/position:     min []  Paraffin,  details:     min []  Vasopneumatic Device, press/temp:     min []  Jose Ordonez / Brianne Larisa: If using vaso (only need to measure limb vaso being performed on)      pre-treatment girth :       post-treatment girth :       measured at (landmark location) :      min []  Other:    Skin assessment post-treatment (if applicable):    [x]  intact    []  redness- no adverse reaction                 []redness - adverse reaction:         Therapeutic Procedures:     Tx Min Billable or 1:1 Min (if diff from Tx Min) Procedure, Rationale, Specifics   20  03376 Therapeutic Exercise (timed):  increase ROM,

## 2023-08-11 ENCOUNTER — HOSPITAL ENCOUNTER (OUTPATIENT)
Facility: HOSPITAL | Age: 73
Setting detail: RECURRING SERIES
Discharge: HOME | End: 2023-08-14
Payer: MEDICARE

## 2023-08-11 PROCEDURE — 97112 NEUROMUSCULAR REEDUCATION: CPT

## 2023-08-11 PROCEDURE — 97110 THERAPEUTIC EXERCISES: CPT

## 2023-08-11 PROCEDURE — 97012 MECHANICAL TRACTION THERAPY: CPT

## 2023-08-11 NOTE — PROGRESS NOTES
PHYSICAL / OCCUPATIONAL THERAPY - DAILY TREATMENT NOTE (updated )    Patient Name: Lamont Fuentes    Date: 2023    : 1950  Insurance: Payor: MEDICARE / Plan: MEDICARE PART A AND B / Product Type: *No Product type* /      Patient  verified Yes     Visit #   Current / Total 2 16   Time   In / Out 10:04 10:58   Pain   In / Out 0/10 0/10   Subjective Functional Status/Changes: Pt reports having a bad day yesterday when trying to campa up silva for BLTs. She states she had to take medication and ice her neck. TREATMENT AREA =  Cervicalgia [M54.2]    OBJECTIVE    Modalities Rationale:     increase tissue extensibility to improve patient's ability to progress to PLOF and address remaining functional goals. min [] Estim Unattended, type/location:                                      []  w/ice    []  w/heat    min [] Estim Attended, type/location:                                     []  w/US     []  w/ice    []  w/heat    []  TENS insruct     20 including set up min [x]  Mechanical Traction: type/lbs Cervical, 20/15#                  []  pro   [x]  sup   []  int   []  cont    []  before manual    []  after manual    min []  Ultrasound, settings/location:      min []  Iontophoresis w/ dexamethasone, location:                                               []  take home patch       []  in clinic    min  unbill []  Ice     []  Heat    location/position:     min []  Paraffin,  details:     min []  Vasopneumatic Device, press/temp:     min []  Sharion Kelly / Annette Laws: If using vaso (only need to measure limb vaso being performed on)      pre-treatment girth :       post-treatment girth :       measured at (landmark location) :      min []  Other:    Skin assessment post-treatment (if applicable):    [x]  intact    []  redness- no adverse reaction                 []redness - adverse reaction:         Therapeutic Procedures:     Tx Min Billable or 1:1 Min (if diff from [employer]) Procedure, Rationale,

## 2023-08-14 ENCOUNTER — HOSPITAL ENCOUNTER (OUTPATIENT)
Facility: HOSPITAL | Age: 73
Setting detail: RECURRING SERIES
Discharge: HOME | End: 2023-08-17
Payer: MEDICARE

## 2023-08-14 PROCEDURE — 97110 THERAPEUTIC EXERCISES: CPT

## 2023-08-14 PROCEDURE — 97012 MECHANICAL TRACTION THERAPY: CPT

## 2023-08-14 PROCEDURE — 97112 NEUROMUSCULAR REEDUCATION: CPT

## 2023-08-14 NOTE — PROGRESS NOTES
PHYSICAL / OCCUPATIONAL THERAPY - DAILY TREATMENT NOTE (updated )    Patient Name: Ezra Lock    Date: 2023    : 1950  Insurance: Payor: MEDICARE / Plan: MEDICARE PART A AND B / Product Type: *No Product type* /      Patient  verified Yes     Visit #   Current / Total 3 16   Time   In / Out 800 850   Pain   In / Out 0-1/10 0/10   Subjective Functional Status/Changes: Pt stated that she thinks that she slept wrong and has a little pain in the neck this morning. TREATMENT AREA =  Cervicalgia [M54.2]    OBJECTIVE    Modalities Rationale:     decrease pain and increase tissue extensibility to improve patient's ability to progress to PLOF and address remaining functional goals. min [] Estim Unattended, type/location:                                      []  w/ice    []  w/heat    min [] Estim Attended, type/location:                                     []  w/US     []  w/ice    []  w/heat    []  TENS insruct     15 min [x]  Mechanical Traction: type/lbs 20/15                  []  pro   [x]  sup   [x]  int   []  cont    []  before manual    []  after manual    min []  Ultrasound, settings/location:      min []  Iontophoresis w/ dexamethasone, location:                                               []  take home patch       []  in clinic    min  unbill []  Ice     []  Heat    location/position:     min []  Paraffin,  details:     min []  Vasopneumatic Device, press/temp:     min []  Patricia Blue / Honey [place]: If using vaso (only need to measure limb vaso being performed on)      pre-treatment girth :       post-treatment girth :       measured at (landmark location) :      min []  Other:    Skin assessment post-treatment (if applicable):    []  intact    []  redness- no adverse reaction                 []redness - adverse reaction:         Therapeutic Procedures:     Tx Min Billable or 1:1 Min (if diff from Tx Min) Procedure, Rationale, Specifics   25  04176 Therapeutic Exercise (timed):

## 2023-08-17 ENCOUNTER — HOSPITAL ENCOUNTER (OUTPATIENT)
Facility: HOSPITAL | Age: 73
Setting detail: RECURRING SERIES
Discharge: HOME | End: 2023-08-20
Payer: MEDICARE

## 2023-08-17 PROCEDURE — 97112 NEUROMUSCULAR REEDUCATION: CPT

## 2023-08-17 PROCEDURE — 97110 THERAPEUTIC EXERCISES: CPT

## 2023-08-17 PROCEDURE — 97012 MECHANICAL TRACTION THERAPY: CPT

## 2023-08-17 NOTE — PROGRESS NOTES
PHYSICAL / OCCUPATIONAL THERAPY - DAILY TREATMENT NOTE (updated )    Patient Name: Gulshan Tong    Date: 2023    : 1950  Insurance: Payor: MEDICARE / Plan: MEDICARE PART A AND B / Product Type: *No Product type* /      Patient  verified Yes     Visit #   Current / Total 4 16   Time   In / Out 800 846   Pain   In / Out 0/10 0/10   Subjective Functional Status/Changes: Pt stated that she is doing well today and has no pain     TREATMENT AREA =  Cervicalgia [M54.2]    OBJECTIVE    Modalities Rationale:     increase tissue extensibility to improve patient's ability to progress to PLOF and address remaining functional goals. min [] Estim Unattended, type/location:                                      []  w/ice    []  w/heat    min [] Estim Attended, type/location:                                     []  w/US     []  w/ice    []  w/heat    []  TENS insruct     15 min [x]  Mechanical Traction: type/lbs 20/15                  []  pro   [x]  sup   [x]  int   []  cont    []  before manual    []  after manual    min []  Ultrasound, settings/location:      min []  Iontophoresis w/ dexamethasone, location:                                               []  take home patch       []  in clinic    min  unbill []  Ice     []  Heat    location/position:     min []  Paraffin,  details:     min []  Vasopneumatic Device, press/temp:     min []  Agustín Lute / Ben Malinks: If using vaso (only need to measure limb vaso being performed on)      pre-treatment girth :       post-treatment girth :       measured at (landmark location) :      min []  Other:    Skin assessment post-treatment (if applicable):    [x]  intact    []  redness- no adverse reaction                 []redness - adverse reaction:         Therapeutic Procedures:     Tx Min Billable or 1:1 Min (if diff from Tx Min) Procedure, Rationale, Specifics   21  31398 Therapeutic Exercise (timed):  increase ROM, strength, coordination, balance, and

## 2023-08-22 ENCOUNTER — HOSPITAL ENCOUNTER (OUTPATIENT)
Facility: HOSPITAL | Age: 73
Setting detail: RECURRING SERIES
Discharge: HOME | End: 2023-08-25
Payer: MEDICARE

## 2023-08-22 PROCEDURE — 97012 MECHANICAL TRACTION THERAPY: CPT

## 2023-08-22 PROCEDURE — 97112 NEUROMUSCULAR REEDUCATION: CPT

## 2023-08-22 PROCEDURE — 97110 THERAPEUTIC EXERCISES: CPT

## 2023-08-22 NOTE — PROGRESS NOTES
PHYSICAL / OCCUPATIONAL THERAPY - DAILY TREATMENT NOTE (updated )    Patient Name: Anita Maria    Date: 2023    : 1950  Insurance: Payor: MEDICARE / Plan: MEDICARE PART A AND B / Product Type: *No Product type* /      Patient  verified Yes     Visit #   Current / Total 5 16   Time   In / Out 8:40 9:37   Pain   In / Out 0/10 0/10   Subjective Functional Status/Changes: Pt. Reports she is feeling pretty good today just a little stiff in her neck. She continues to have increased symptoms with a lot of bending over like with yard work but is able to manage symptoms with ice and exercises. TREATMENT AREA =  Cervicalgia [M54.2]    OBJECTIVE    Modalities Rationale:     decrease pain to improve patient's ability to progress to PLOF and address remaining functional goals. min [] Estim Unattended, type/location:                                      []  w/ice    []  w/heat    min [] Estim Attended, type/location:                                     []  w/US     []  w/ice    []  w/heat    []  TENS insruct     15 min []  Mechanical Traction: type/lbs 24/16# 25 degrees                  []  pro   [x]  sup   [x]  int   []  cont    []  before manual    []  after manual    min []  Ultrasound, settings/location:      min  unbill []  Ice     []  Heat    location/position:     min []  Paraffin,  details:     min []  Vasopneumatic Device, press/temp:     min []  [place] Riddles / Andreina Port: If using vaso (only need to measure limb vaso being performed on)      pre-treatment girth :       post-treatment girth :       measured at (landmark location) :      min []  Other:    Skin assessment post-treatment:  Intact      Therapeutic Procedures:     Tx Min Billable or 1:1 Min (if diff from Tx Min) Procedure, Rationale, Specifics   34  24767 Therapeutic Exercise (timed):  increase ROM, strength, coordination, balance, and proprioception to improve patient's ability to progress to PLOF and address remaining functional

## 2023-08-24 ENCOUNTER — HOSPITAL ENCOUNTER (OUTPATIENT)
Facility: HOSPITAL | Age: 73
Setting detail: RECURRING SERIES
Discharge: HOME | End: 2023-08-27
Payer: MEDICARE

## 2023-08-24 PROCEDURE — 97112 NEUROMUSCULAR REEDUCATION: CPT

## 2023-08-24 PROCEDURE — 97012 MECHANICAL TRACTION THERAPY: CPT

## 2023-08-24 PROCEDURE — 97110 THERAPEUTIC EXERCISES: CPT

## 2023-08-24 NOTE — PROGRESS NOTES
PHYSICAL / OCCUPATIONAL THERAPY - DAILY TREATMENT NOTE (updated )    Patient Name: Ellie Ziegler    Date: 2023    : 1950  Insurance: Payor: MEDICARE / Plan: MEDICARE PART A AND B / Product Type: *No Product type* /      Patient  verified Yes     Visit #   Current / Total 6 16   Time   In / Out 800 855   Pain   In / Out 0/10 0/10   Subjective Functional Status/Changes: Pt stated that she had some symptoms with making her bed this morning, but it resolved     TREATMENT AREA =  Cervicalgia [M54.2]    OBJECTIVE    Modalities Rationale:     increase tissue extensibility to improve patient's ability to progress to PLOF and address remaining functional goals. min [] Estim Unattended, type/location:                                      []  w/ice    []  w/heat    min [] Estim Attended, type/location:                                     []  w/US     []  w/ice    []  w/heat    []  TENS insruct     15 min [x]  Mechanical Traction: type/lbs #  25 degrees                  []  pro   [x]  sup   []  int   [x]  cont    []  before manual    []  after manual    min []  Ultrasound, settings/location:      min  unbill []  Ice     []  Heat    location/position:     min []  Paraffin,  details:     min []  Vasopneumatic Device, press/temp:     min []  [place] / Petar Hammed: If using vaso (only need to measure limb vaso being performed on)      pre-treatment girth :       post-treatment girth :       measured at (landmark location) :      min []  Other:    Skin assessment post-treatment:  Intact      Therapeutic Procedures: Tx Min Billable or 1:1 Min (if diff from Tx Min) Procedure, Rationale, Specifics   30  29665 Therapeutic Exercise (timed):  increase ROM, strength, coordination, balance, and proprioception to improve patient's ability to progress to PLOF and address remaining functional goals.  (see flow sheet as applicable)     Details if applicable:       10  72643 Neuromuscular Re-Education (timed):

## 2023-08-28 ENCOUNTER — HOSPITAL ENCOUNTER (OUTPATIENT)
Facility: HOSPITAL | Age: 73
Setting detail: RECURRING SERIES
Discharge: HOME | End: 2023-08-31
Payer: MEDICARE

## 2023-08-28 PROCEDURE — 97012 MECHANICAL TRACTION THERAPY: CPT

## 2023-08-28 PROCEDURE — 97110 THERAPEUTIC EXERCISES: CPT

## 2023-08-28 PROCEDURE — 97112 NEUROMUSCULAR REEDUCATION: CPT

## 2023-08-28 NOTE — PROGRESS NOTES
PHYSICAL / OCCUPATIONAL THERAPY - DAILY TREATMENT NOTE (updated )    Patient Name: Ezra Lock    Date: 2023    : 1950  Insurance: Payor: MEDICARE / Plan: MEDICARE PART A AND B / Product Type: *No Product type* /      Patient  verified Yes     Visit #   Current / Total 7 16   Time   In / Out 8:40 9:40   Pain   In / Out 1-210 0/10   Subjective Functional Status/Changes: Pt reports once again feeling symptoms with unloading the . She gets the same symptoms every morning making the bed and when she is cooking like making silva. TREATMENT AREA =  Cervicalgia [M54.2]    OBJECTIVE    Modalities Rationale:     increase tissue extensibility to improve patient's ability to progress to PLOF and address remaining functional goals. min [] Estim Unattended, type/location:                                      []  w/ice    []  w/heat    min [] Estim Attended, type/location:                                     []  w/US     []  w/ice    []  w/heat    []  TENS insruct     20 including set up min [x]  Mechanical Traction: type/lbs 25/17#  23 degrees                  []  pro   [x]  sup   [x]  int   []  cont    []  before manual    []  after manual    min []  Ultrasound, settings/location:      min  unbill []  Ice     []  Heat    location/position:     min []  Paraffin,  details:     min []  Vasopneumatic Device, press/temp:     min []  Patricia Blue / Honey [place]: If using vaso (only need to measure limb vaso being performed on)      pre-treatment girth :       post-treatment girth :       measured at (landmark location) :      min []  Other:    Skin assessment post-treatment:  Intact      Therapeutic Procedures: Tx Min Billable or 1:1 Min (if diff from Tx Min) Procedure, Rationale, Specifics   30  31370 Therapeutic Exercise (timed):  increase ROM, strength, coordination, balance, and proprioception to improve patient's ability to progress to PLOF and address remaining functional goals.  (see

## 2023-08-31 ENCOUNTER — HOSPITAL ENCOUNTER (OUTPATIENT)
Facility: HOSPITAL | Age: 73
Setting detail: RECURRING SERIES
End: 2023-08-31
Payer: MEDICARE

## 2023-08-31 PROCEDURE — 97112 NEUROMUSCULAR REEDUCATION: CPT

## 2023-08-31 PROCEDURE — 97110 THERAPEUTIC EXERCISES: CPT

## 2023-08-31 NOTE — PROGRESS NOTES
PHYSICAL / OCCUPATIONAL THERAPY - DAILY TREATMENT NOTE (updated )    Patient Name: Mihir Kellogg    Date: 2023    : 1950  Insurance: Payor: MEDICARE / Plan: MEDICARE PART A AND B / Product Type: *No Product type* /      Patient  verified Yes     Visit #   Current / Total 8 16   Time   In / Out 8:41 9:19   Pain   In / Out 1/10 0/10   Subjective Functional Status/Changes: Pt. Reports feeling about the same. She has a little neck pain this moring. TREATMENT AREA =  Cervicalgia [M54.2]    OBJECTIVE    Therapeutic Procedures: Tx Min Billable or 1:1 Min (if diff from Tx Min) Procedure, Rationale, Specifics   28  42998 Therapeutic Exercise (timed):  increase ROM, strength, coordination, balance, and proprioception to improve patient's ability to progress to PLOF and address remaining functional goals. (see flow sheet as applicable)     Details if applicable:  see flow sheet     10  47908 Neuromuscular Re-Education (timed):  improve balance, coordination, kinesthetic sense, posture, core stability and proprioception to improve patient's ability to develop conscious control of individual muscles and awareness of position of extremities in order to progress to PLOF and address remaining functional goals.  (see flow sheet as applicable)     Details if applicable:  postural cues during exercises           Details if applicable:            Details if applicable:            Details if applicable:     45  [facility] BC Totals Reminder: bill using total billable min of TIMED therapeutic procedures (example: do not include dry needle or estim unattended, both untimed codes, in totals to left)  8-22 min = 1 unit; 23-37 min = 2 units; 38-52 min = 3 units; 53-67 min = 4 units; 68-82 min = 5 units   Total Total     [x]  Patient Education billed concurrently with other procedures   [x] Review HEP    [] Progressed/Changed HEP, detail:    [] Other detail:       Objective Information/Functional Measures/Assessment    Performed chin tuck with lift without pillow today  She was challenged with chin tuck and lift with rotation but was able to perform with good form  Held traction today secondary to needing to go to another appointment    Assessment  Pt. Is progressing slowly towards goals. She continues to have increased radicular symptoms with looking down and having her arms out in front but is able to self correct. She demonstrates improving cervical strength during exercises. Patient will continue to benefit from skilled PT / OT services to modify and progress therapeutic interventions, analyze and address functional mobility deficits, analyze and address ROM deficits, analyze and address strength deficits, analyze and address soft tissue restrictions, analyze and cue for proper movement patterns, analyze and modify for postural abnormalities, and analyze and address imbalance/dizziness to address functional deficits and attain remaining goals. Progress toward goals / Updated goals:  []  See Progress Note/Recertification    1. Patient will increase DNF (tuck with lift) endurance to 30 seconds to improve c/s stability and maintain centralized radicular symptoms. Status at progress note: new goal  Increased hold times (8/28/23)     2. Pt will improve lower trap strength to at least 4+/5 to improve ease with ADLs. Status at progress note: left 4/5; right 4-/5     3. Patient will report 50% improvement in symptoms to increase ease of completing laundry, cooking and making bed.   Status at progress note: continues to have symptoms but able to centralize independently  Not met: 30-40% (8/22/23)     Next PN/ RC due 5/4/01, cert due 72/8/89    PLAN  Yes  Continue plan of care  []  Upgrade activities as tolerated  []  Discharge due to :  []  Other:    Lucero Dover PT    8/31/2023    8:04 AM    Future Appointments   Date Time Provider [address]   8/31/2023  8:40 AM Lucero Dover PT 100

## 2023-09-06 ENCOUNTER — HOSPITAL ENCOUNTER (OUTPATIENT)
Facility: HOSPITAL | Age: 73
Setting detail: RECURRING SERIES
Discharge: HOME | End: 2023-09-09
Payer: MEDICARE

## 2023-09-06 PROCEDURE — 97110 THERAPEUTIC EXERCISES: CPT

## 2023-09-06 PROCEDURE — 97530 THERAPEUTIC ACTIVITIES: CPT

## 2023-09-06 PROCEDURE — 97112 NEUROMUSCULAR REEDUCATION: CPT

## 2023-09-06 NOTE — PROGRESS NOTES
[facility] - Alex Thompson  [address] [facility] [address]  [phone] fax    Medicare Progress Report    Patient name: Elliott Cantu Start of Care: 2023   Referral source: Melanie Ferguson : 1950   Medical/Treatment Diagnosis: Cervicalgia [M54.2]  Payor: MEDICARE / Plan: MEDICARE PART A AND B / Product Type: *No Product type* /  Onset Date: Worsened during the last several months     Prior Hospitalization: see medical history Provider#: [id number]   Comorbidities: Alcohol use, hysterectomy & bladder lift, HTN, skin cancer  Prior Level of Function:mod ind, Left handed, gardening, walking, housekeeping. Visits from Start of Care: 22   Missed Visits: 1    Reporting Period: 2023 to 2023    Subjective Reports: Pt is able to independently centralize symptoms by changing her neck position to neutral or c/s retraction. Pt reports most difficulty with looking down activities like doing laundry, cutting vegetables for cooking, and making her bed. She has good relief with ice as well. She is getting an injection today and then will be getting an EMG on Friday. Current Status/Treatment Goals:  1. Patient will increase DNF (tuck with lift) endurance to 30 seconds to improve c/s stability and maintain centralized radicular symptoms. Status at progress note: new goal  Current: 32 seconds     2. Pt will improve lower trap strength to at least 4+/5 to improve ease with ADLs. Status at progress note: left 4/5; right 4-/5  Current: B 4/5     3. Patient will report 50% improvement in symptoms to increase ease of completing laundry, cooking and making bed.   Status at progress note: continues to have symptoms but able to centralize independently  Current Status: 30-40%    Key functional changes: centralizing symptoms, improved posture awareness, 50% improvement      Problems/ barriers to goal attainment: decreased posterior kinetic chain

## 2023-09-06 NOTE — PROGRESS NOTES
PHYSICAL / OCCUPATIONAL THERAPY - DAILY TREATMENT NOTE (updated )    Patient Name: Argenis Storey    Date: 2023    : 1950  Insurance: Payor: MEDICARE / Plan: MEDICARE PART A AND B / Product Type: *No Product type* /      Patient  verified Yes     Visit #   Current / Total 9 16   Time   In / Out 8:40 9:21   Pain   In / Out 0/10 0/10   Subjective Functional Status/Changes: Pt states she was put back on medication (meloxicam and gabapentin) and is getting an injection today in her neck with potentially one more injection to follow. She will be getting an EMG on Friday. Pt will be gone -10/2. She goes back to the MD on . TREATMENT AREA =  Cervicalgia [M54.2]    OBJECTIVE    Therapeutic Procedures: Tx Min Billable or 1:1 Min (if diff from Tx Min) Procedure, Rationale, Specifics   15  14652 Therapeutic Exercise (timed):  increase ROM, strength, coordination, balance, and proprioception to improve patient's ability to progress to PLOF and address remaining functional goals. (see flow sheet as applicable)     Details if applicable:  see flow sheet     10  47117 Neuromuscular Re-Education (timed):  improve balance, coordination, kinesthetic sense, posture, core stability and proprioception to improve patient's ability to develop conscious control of individual muscles and awareness of position of extremities in order to progress to PLOF and address remaining functional goals. (see flow sheet as applicable)     Details if applicable:  postural cues during exercises    16   51603 Therapeutic Activity (timed):  use of dynamic activities replicating functional movements to increase ROM, strength, coordination, balance, and proprioception in order to improve patient's ability to progress to PLOF and address remaining functional goals.   (see flow sheet as applicable)      Details if applicable:  goal reassessment; posture re-ed; self 1st rib mobs   41  [facility] BC Totals Reminder: bill using total

## 2023-09-08 ENCOUNTER — HOSPITAL ENCOUNTER (OUTPATIENT)
Facility: HOSPITAL | Age: 73
Setting detail: RECURRING SERIES
Discharge: HOME | End: 2023-09-11
Payer: MEDICARE

## 2023-09-08 PROCEDURE — 97140 MANUAL THERAPY 1/> REGIONS: CPT

## 2023-09-08 PROCEDURE — 97110 THERAPEUTIC EXERCISES: CPT

## 2023-09-08 NOTE — PROGRESS NOTES
PHYSICAL / OCCUPATIONAL THERAPY - DAILY TREATMENT NOTE (updated )    Patient Name: Venkatesh Cardona    Date: 2023    : 1950  Insurance: Payor: MEDICARE / Plan: MEDICARE PART A AND B / Product Type: *No Product type* /      Patient  verified Yes     Visit #   Current / Total 10 16   Time   In / Out 8:40 9:20   Pain   In / Out 0/10 0/10   Subjective Functional Status/Changes: Pt reports injection went well on . She states a \"headband\" headache afterwards and some soreness in her right jaw but that was it. She felt she could pack her home emergency kit without the same intensity or length of symptoms. She goes for her EMG today. TREATMENT AREA =  Cervicalgia [M54.2]    OBJECTIVE    Therapeutic Procedures: Tx Min Billable or 1:1 Min (if diff from Tx Min) Procedure, Rationale, Specifics   25  18946 Therapeutic Exercise (timed):  increase ROM, strength, coordination, balance, and proprioception to improve patient's ability to progress to PLOF and address remaining functional goals. (see flow sheet as applicable)     Details if applicable:  see flow sheet     15  00077 Manual Therapy (timed):  decrease pain to improve patient's ability to progress to PLOF and address remaining functional goals. The manual therapy interventions were performed at a separate and distinct time from the therapeutic activities interventions . (see flow sheet as applicable)     Details if applicable:  MET for left C3-C7 rotation; MET for left elevated 1st rib.  TPR right middle/posterior scalenes and splenius capitus   40  [facility] Totals Reminder: bill using total billable min of TIMED therapeutic procedures (example: do not include dry needle or estim unattended, both untimed codes, in totals to left)  8-22 min = 1 unit; 23-37 min = 2 units; 38-52 min = 3 units; 53-67 min = 4 units; 68-82 min = 5 units   Total Total     [x]  Patient Education billed concurrently with other procedures   [x] Review HEP    []

## 2023-09-11 ENCOUNTER — HOSPITAL ENCOUNTER (OUTPATIENT)
Facility: HOSPITAL | Age: 73
Setting detail: RECURRING SERIES
Discharge: HOME | End: 2023-09-14
Payer: MEDICARE

## 2023-09-11 PROCEDURE — 97112 NEUROMUSCULAR REEDUCATION: CPT

## 2023-09-11 PROCEDURE — 97110 THERAPEUTIC EXERCISES: CPT

## 2023-09-11 NOTE — PROGRESS NOTES
Austin Mercedes, PTA [id number] [facility]   10/12/2023  8:40 AM Jose J Davis, PT [id number] [facility]   10/17/2023  8:40 AM Austin Mercedes, PTA [id number] [facility]   10/20/2023  8:40 AM Austin Mercedes, PTA [id number] [facility]   10/23/2023  8:40 AM Austin Mercedes, PTA [email or web address] [facility]   10/26/2023  8:40 AM Jose J Davis, PT [id number] [facility]

## 2023-09-14 ENCOUNTER — HOSPITAL ENCOUNTER (OUTPATIENT)
Facility: HOSPITAL | Age: 73
Setting detail: RECURRING SERIES
Discharge: HOME | End: 2023-09-17
Payer: MEDICARE

## 2023-09-14 PROCEDURE — 97110 THERAPEUTIC EXERCISES: CPT

## 2023-09-14 PROCEDURE — 97112 NEUROMUSCULAR REEDUCATION: CPT

## 2023-09-14 NOTE — PROGRESS NOTES
PTA [id number] [facility]   9/18/2023  8:40 AM Feliz Lucian, PT [id number] [facility]   9/21/2023  8:40 AM Feliz Lucian, PT [id number] [facility]   10/3/2023  8:00 AM Parris Cooks, PTA [id number] [facility]   10/5/2023  8:40 AM Feliz Lucian, PT [id number] [facility]   10/10/2023  8:00 AM Parris Cooks, PTA [id number] [facility]   10/12/2023  8:40 AM Feliz Lucian, PT [id number] [facility]   10/17/2023  8:40 AM Parris Cooks, PTA [id number] [facility]   10/20/2023  8:40 AM Parris Cooks, PTA [id number] [facility]   10/23/2023  8:40 AM Parris Cooks, PTA [id number] [facility]   10/26/2023  8:40 AM Feliz Epstein, PT [id number] [facility]

## 2023-09-18 ENCOUNTER — HOSPITAL ENCOUNTER (OUTPATIENT)
Facility: HOSPITAL | Age: 73
Setting detail: RECURRING SERIES
Discharge: HOME | End: 2023-09-21
Payer: MEDICARE

## 2023-09-18 PROCEDURE — 97112 NEUROMUSCULAR REEDUCATION: CPT

## 2023-09-18 PROCEDURE — 97110 THERAPEUTIC EXERCISES: CPT

## 2023-09-18 NOTE — PROGRESS NOTES
[facility]   10/12/2023  8:40 AM Elke Dalton, PT [id number] [facility]   10/17/2023  8:40 AM Haseeb Nicole, PTA [id number] [facility]   10/20/2023  8:40 AM Haseeb Nicole, PTA [id number] [facility]   10/23/2023  8:40 AM Haseeb Nicole, PTA [id number] [facility]   10/26/2023  8:40 AM Elke Dalton, PT [id number] [facility]

## 2023-09-21 ENCOUNTER — HOSPITAL ENCOUNTER (OUTPATIENT)
Facility: HOSPITAL | Age: 73
Setting detail: RECURRING SERIES
Discharge: HOME | End: 2023-09-24
Payer: MEDICARE

## 2023-09-21 PROCEDURE — 97112 NEUROMUSCULAR REEDUCATION: CPT

## 2023-09-21 PROCEDURE — 97110 THERAPEUTIC EXERCISES: CPT

## 2023-09-21 NOTE — PROGRESS NOTES
[facility]   10/23/2023  8:40 AM Jaqueline Wan PTA [id number] [facility]   10/26/2023  8:40 AM Ginny Rubio PT [id number] [facility]

## 2023-10-03 ENCOUNTER — HOSPITAL ENCOUNTER (OUTPATIENT)
Facility: HOSPITAL | Age: 73
Setting detail: RECURRING SERIES
Discharge: HOME | End: 2023-10-06
Payer: MEDICARE

## 2023-10-03 PROCEDURE — 97530 THERAPEUTIC ACTIVITIES: CPT

## 2023-10-03 PROCEDURE — 97110 THERAPEUTIC EXERCISES: CPT

## 2023-10-03 PROCEDURE — 97112 NEUROMUSCULAR REEDUCATION: CPT

## 2023-10-03 NOTE — PROGRESS NOTES
PHYSICAL / OCCUPATIONAL THERAPY - DAILY TREATMENT NOTE (updated )    Patient Name: Sam Mazariegos    Date: 10/3/2023    : 1950  Insurance: Payor: MEDICARE / Plan: MEDICARE PART A AND B / Product Type: *No Product type* /      Patient  verified Yes     Visit #   Current / Total 15 16   Time   In / Out 8:00 8:40   Pain   In / Out 1/10 1/10   Subjective Functional Status/Changes: Pt reports she can get injections quarterly so she could get another one in December. She reports 40-50% improvement overall. She feels confident with exercises to perform at home and ready for D/C. TREATMENT AREA =  Cervicalgia [M54.2]    OBJECTIVE    Therapeutic Procedures: Tx Min Billable or 1:1 Min (if diff from Tx Min) Procedure, Rationale, Specifics   20  82870 Therapeutic Exercise (timed):  increase ROM, strength, coordination, balance, and proprioception to improve patient's ability to progress to PLOF and address remaining functional goals. (see flow sheet as applicable)     Details if applicable:  see flow sheet     10  90026 Therapeutic Activity (timed):  use of dynamic activities replicating functional movements to increase ROM, strength, coordination, balance, and proprioception in order to improve patient's ability to progress to PLOF and address remaining functional goals. (see flow sheet as applicable)      Details if applicable:  goal reassessment; patient education   10  45835 Neuromuscular Re-Education (timed):  improve balance, coordination, kinesthetic sense, posture, core stability and proprioception to improve patient's ability to develop conscious control of individual muscles and awareness of position of extremities in order to progress to PLOF and address remaining functional goals.  (see flow sheet as applicable)     Details if applicable:  cervical retractions, ball on wall chin tucks, wall Y   40  [facility] Totals Reminder: bill using total billable min of TIMED therapeutic procedures (example:

## 2023-10-03 NOTE — PROGRESS NOTES
[facility] - [place] [id number] [facility] [place]  [phone] fax    Physical Therapy Discharge Summary    Patient name: Venkatesh Cardona Start of Care: 2023   Referral source: Ольга Dhillon, [address] [place] [address] : 1950   Medical/Treatment Diagnosis: Cervicalgia [M54.2]  Payor: MEDICARE / Plan: MEDICARE PART A AND B / Product Type: *No Product type* /  Onset Date: Worsened during the last several months     Prior Hospitalization: see medical history Provider#: [id number]   Comorbidities: Alcohol use, hysterectomy & bladder lift, HTN, skin cancer  Prior Level of Function:mod ind, Left handed, gardening, walking, housekeeping. Visits from Start of Care: 28    Missed Visits: 1    Reporting Period : 2023 to 10/3/2023    Summary of Care:  1. Pt will improve lower trap strength to at least 4+/5 to improve ease with ADLs. Status at progress note: B 4/5  Status at discharge: not met 4/5      2. Patient will report 50% improvement in symptoms to increase ease of completing laundry, cooking and making bed. Status at progress note: 30-40%   Status at discharge: met 40-50%    ASSESSMENT/RECOMMENDATIONS: Mrs. Amy Patel made slow progress towards final goals with plateau in progress at this time. She is able to independently centralize her symptoms with exercises, posture awareness and use of ice. Her lower trap MMT remained at 4/5. She does subjectively report 40-50% improvement overall. She followed up with the PA and was provided options in the future including a second injection in December, nerve ablation and/or potential surgery should symptoms fail to centralize along the left C6-C7 nerve. She is independent with a final comprehensive program focused on c/s stability and posterior kinetic chain strength and will discharge from skilled physical therapy at this time.      [x]Discontinue therapy: [x]Patient has reached or is progressing toward set

## 2023-10-03 NOTE — PROGRESS NOTES
Physical Therapy Discharge Instructions      [facility] - [place] [employer]  [address] [facility] [place]  [phone] fax    Patient: Tristin Russ  : 1950      Continue Home Exercise Program 3 times per week      Continue with    [x] Ice  as needed     [x] Heat           Follow up with MD:     [] Upon completion of therapy     [x] As needed      Recommendations:     [x]   Return to activity with home program    []   Return to activity with the following modifications:       []Post Rehab Program    []Join Independent aquatic program     []Return to/join local gym        Additional Comments: Scarves during colder months to reduce shrugging, limit OH lifting >10# to reduce cervical compression, return to therapy if needed or symptoms fail to centralize

## 2023-10-05 ENCOUNTER — APPOINTMENT (OUTPATIENT)
Facility: HOSPITAL | Age: 73
End: 2023-10-05
Payer: MEDICARE

## 2023-10-10 ENCOUNTER — APPOINTMENT (OUTPATIENT)
Facility: HOSPITAL | Age: 73
End: 2023-10-10
Payer: MEDICARE

## 2023-10-12 ENCOUNTER — APPOINTMENT (OUTPATIENT)
Facility: HOSPITAL | Age: 73
End: 2023-10-12
Payer: MEDICARE

## 2023-10-17 ENCOUNTER — APPOINTMENT (OUTPATIENT)
Facility: HOSPITAL | Age: 73
End: 2023-10-17
Payer: MEDICARE

## 2023-10-20 ENCOUNTER — APPOINTMENT (OUTPATIENT)
Facility: HOSPITAL | Age: 73
End: 2023-10-20
Payer: MEDICARE

## 2023-10-23 ENCOUNTER — APPOINTMENT (OUTPATIENT)
Facility: HOSPITAL | Age: 73
End: 2023-10-23
Payer: MEDICARE

## 2023-10-26 ENCOUNTER — APPOINTMENT (OUTPATIENT)
Facility: HOSPITAL | Age: 73
End: 2023-10-26
Payer: MEDICARE

## 2025-01-31 ENCOUNTER — HOSPITAL ENCOUNTER (OUTPATIENT)
Facility: HOSPITAL | Age: 75
Setting detail: SPECIMEN
Discharge: HOME | End: 2025-02-03
Payer: MEDICARE

## 2025-01-31 PROCEDURE — 88305 TISSUE EXAM BY PATHOLOGIST: CPT

## (undated) DEVICE — X-RAY SPONGES,12 PLY: Brand: DERMACEA

## (undated) DEVICE — REM POLYHESIVE ADULT PATIENT RETURN ELECTRODE: Brand: VALLEYLAB

## (undated) DEVICE — 3M(TM) MEDIPORE(TM) H SOFT CLOTH TAPE 2861: Brand: 3M™ MEDIPORE™

## (undated) DEVICE — DRAPE TWL SURG 16X26IN BLU ORB04] ALLCARE INC]

## (undated) DEVICE — GLOVE SURG BIOGEL 8.0 STRL -- SKINSENSE

## (undated) DEVICE — SOLUTION IV 1000ML 0.9% SOD CHL

## (undated) DEVICE — HEX-LOCKING BLADE ELECTRODE: Brand: EDGE

## (undated) DEVICE — USP TYPE VII GAUZE,8 PLY: Brand: DERMACEA

## (undated) DEVICE — 3M™ STERI-STRIP™ COMPOUND BENZOIN TINCTURE 40 BAGS/CARTON 4 CARTONS/CASE C1544: Brand: 3M™ STERI-STRIP™

## (undated) DEVICE — STRAP CATH CTH-SECUR UNIV 2IN --

## (undated) DEVICE — BAG DRAINAGE CUST DISP

## (undated) DEVICE — DERMACEA GAUZE ROLL: Brand: DERMACEA

## (undated) DEVICE — SUT CHRMC 1 36IN CT1 BRN --

## (undated) DEVICE — SKIN MARKER,REGULAR TIP WITH RULER AND LABELS: Brand: DEVON

## (undated) DEVICE — SYR 10ML LUER LOK 1/5ML GRAD --

## (undated) DEVICE — STERILE POLYISOPRENE POWDER-FREE SURGICAL GLOVES: Brand: PROTEXIS

## (undated) DEVICE — MEDI-VAC NON-CONDUCTIVE SUCTION TUBING: Brand: CARDINAL HEALTH

## (undated) DEVICE — KENDALL SCD EXPRESS SLEEVES, KNEE LENGTH, MEDIUM: Brand: KENDALL SCD

## (undated) DEVICE — INTENDED FOR TISSUE SEPARATION, AND OTHER PROCEDURES THAT REQUIRE A SHARP SURGICAL BLADE TO PUNCTURE OR CUT.: Brand: BARD-PARKER SAFETY BLADES SIZE 10, STERILE

## (undated) DEVICE — 4-PORT MANIFOLD: Brand: NEPTUNE 2

## (undated) DEVICE — DRAPE SURG L39XW46IN PERI OB

## (undated) DEVICE — TRAY CATH OD16FR SIL URIN M STATLOK STBL DEV SURSTP

## (undated) DEVICE — GAUZE SPONGES,8 PLY: Brand: CURITY

## (undated) DEVICE — Device

## (undated) DEVICE — 3M(TM) TEGADERM(TM) TRANSPARENT FILM DRESSING FRAME STYLE 9505W: Brand: 3M™ TEGADERM™

## (undated) DEVICE — TUBING IRRIG L77IN DIA0.241IN L BOR FOR CYSTO W/ NVENT

## (undated) DEVICE — KIT CLN UP BON SECOURS MARYV

## (undated) DEVICE — 3M™ BAIR PAWS FLEX™ WARMING GOWN, STANDARD, 20 PER CASE 81003: Brand: BAIR PAWS™

## (undated) DEVICE — SUTURE VCRL SZ 1 L36IN ABSRB VLT L36MM CT-1 1/2 CIR J347H

## (undated) DEVICE — SUTURE VCRL SZ 3-0 L27IN ABSRB UD L26MM SH 1/2 CIR J416H

## (undated) DEVICE — 3M™ STERI-STRIP™ REINFORCED ADHESIVE SKIN CLOSURES, R1542, 1/4 IN X 1-1/2 IN (6 MM X 38 MM), 6 STRIPS/ENVELOPE: Brand: 3M™ STERI-STRIP™

## (undated) DEVICE — SUT CHRMC 3-0 27IN SH BRN --